# Patient Record
Sex: FEMALE | Employment: OTHER | URBAN - METROPOLITAN AREA
[De-identification: names, ages, dates, MRNs, and addresses within clinical notes are randomized per-mention and may not be internally consistent; named-entity substitution may affect disease eponyms.]

---

## 2017-05-12 ENCOUNTER — DOCUMENTATION ONLY (OUTPATIENT)
Dept: SLEEP MEDICINE | Age: 72
End: 2017-05-12

## 2017-05-12 NOTE — PROGRESS NOTES
Patient called need new strap for cpap mask, advised to call dme that order is still valid for cpap supplies.

## 2017-07-13 ENCOUNTER — OFFICE VISIT (OUTPATIENT)
Dept: SLEEP MEDICINE | Age: 72
End: 2017-07-13

## 2017-07-13 ENCOUNTER — DOCUMENTATION ONLY (OUTPATIENT)
Dept: SLEEP MEDICINE | Age: 72
End: 2017-07-13

## 2017-07-13 VITALS
DIASTOLIC BLOOD PRESSURE: 72 MMHG | HEIGHT: 64 IN | WEIGHT: 186 LBS | BODY MASS INDEX: 31.76 KG/M2 | SYSTOLIC BLOOD PRESSURE: 116 MMHG | HEART RATE: 71 BPM | OXYGEN SATURATION: 94 % | TEMPERATURE: 97.6 F

## 2017-07-13 DIAGNOSIS — G47.39 MIXED SLEEP APNEA: Primary | ICD-10-CM

## 2017-07-13 NOTE — MR AVS SNAPSHOT
Visit Information Date & Time Provider Department Dept. Phone Encounter #  
 7/13/2017 10:00 AM Adele Devlin, [address] [facility] [id number] Follow-up Instructions Return in about 1 year (around 7/13/2018), or if symptoms worsen or fail to improve. Follow-up and Disposition History Upcoming Health Maintenance Date Due DTaP/Tdap/Td series (1 - Tdap) 8/13/1967 BREAST CANCER SCRN MAMMOGRAM 8/13/1996 FOBT Q 1 YEAR AGE 50-75 8/13/1996 ZOSTER VACCINE AGE 60> 8/13/2006 GLAUCOMA SCREENING Q2Y 8/13/2011 Pneumococcal 65+ Low/Medium Risk (1 of 2 - PCV13) 8/13/2011 MEDICARE YEARLY EXAM 8/13/2011 INFLUENZA AGE 9 TO ADULT 8/1/2017 Allergies as of 7/13/2017  Review Complete On: 7/13/2017 By: Adele Devlin MD  
 No Known Allergies Current Immunizations  Never Reviewed No immunizations on file. Not reviewed this visit You Were Diagnosed With   
  
 Codes Comments Mixed sleep apnea    -  Primary ICD-10-CM: G47.39 
ICD-9-CM: 327.29 Vitals BP Pulse Temp Height(growth percentile) Weight(growth percentile) SpO2  
 116/72 71 97.6 °F (36.4 °C) 5' 4\" (1.626 m) 186 lb (84.4 kg) 94% BMI OB Status Smoking Status 31.93 kg/m2 Postmenopausal Never Smoker Vitals History BMI and BSA Data Body Mass Index Body Surface Area  
 31.93 kg/m 2 1.95 m 2 Your Updated Medication List  
  
   
This list is accurate as of: 7/13/17 10:22 AM.  Always use your most recent med list.  
  
  
  
  
 acetaminophen 325 mg tablet Commonly known as:  TYLENOL Take  by mouth every four (4) hours as needed for Pain. ARICEPT 10 mg tablet Generic drug:  donepezil Take 10 mg by mouth nightly. aspirin delayed-release 81 mg tablet Take  by mouth daily. baclofen 10 mg tablet Commonly known as:  LIORESAL Take 10 mg by mouth three (3) times daily. calcium-cholecalciferol (d3) 600-125 mg-unit Tab Take  by mouth. FISH OIL 1,000 mg Cap Generic drug:  omega-3 fatty acids-vitamin e Take 1 Cap by mouth. HYDROcodone-acetaminophen 5-325 mg per tablet Commonly known as:  [place] Take  by mouth.  
  
 lamoTRIgine 25 mg tablet Commonly known as: LaMICtal  
Take  by mouth daily. levothyroxine 75 mcg tablet Commonly known as:  SYNTHROID Take 75 mcg by mouth Daily (before breakfast). memantine 10 mg tablet Commonly known as:  Av Pry Take  by mouth daily. multivitamin with iron tablet Take 1 Tab by mouth daily. pravastatin 40 mg tablet Commonly known as:  PRAVACHOL Take 40 mg by mouth nightly. sertraline 50 mg tablet Commonly known as:  ZOLOFT Take  by mouth daily. VITAMIN D3 2,000 unit Tab Generic drug:  cholecalciferol (vitamin D3) Take  by mouth. We Performed the Following AMB SUPPLY ORDER [[id number] Custom] Comments:  
 Wireless modem enrollment with privileges to change therapy remotely - indefinite. PAP Mask -patient preference, tubing, filters as needed (all sleep supplies) Length of Need = 99 months Satinder Rooney M.D.  (electronically signed) Diplomate in Sleep Medicine, [facility] Follow-up Instructions Return in about 1 year (around 7/13/2018), or if symptoms worsen or fail to improve. Patient Instructions [address] [facility] [place]. 16[address] [facility], [address] Tel.  [phone] Fax. 100 [place] 60 [facility] 200 [facility] Tel.  [phone] Fax. [phone] [address] [place] [address] Tel.  [phone] Fax. [phone] Learning About CPAP for Sleep Apnea What is CPAP? CPAP is a small machine that you use at home every night while you sleep.  It increases air pressure in your throat to keep your airway open. When you have sleep apnea, this can help you sleep better so you feel much better. CPAP stands for \"continuous positive airway pressure. \" The CPAP machine will have one of the following: · A mask that covers your nose and mouth · Prongs that fit into your nose · A mask that covers your nose only, the most common type. This type is called NCPAP. The N stands for \"nasal.\" Why is it done? CPAP is usually the best treatment for obstructive sleep apnea. It is the first treatment choice and the most widely used. Your doctor may suggest CPAP if you have: · Moderate to severe sleep apnea. · Sleep apnea and coronary artery disease (CAD) or heart failure. How does it help? · CPAP can help you have more normal sleep, so you feel less sleepy and more alert during the daytime. · CPAP may help keep heart failure or other heart problems from getting worse. · NCPAP may help lower your blood pressure. · If you use CPAP, your bed partner may also sleep better because you are not snoring or restless. What are the side effects? Some people who use CPAP have: · A dry or stuffy nose and a sore throat. · Irritated skin on the face. · Sore eyes. · Bloating. If you have any of these problems, work with your doctor to fix them. Here are some things you can try: · Be sure the mask or nasal prongs fit well. · See if your doctor can adjust the pressure of your CPAP. · If your nose is dry, try a humidifier. · If your nose is runny or stuffy, try decongestant medicine or a steroid nasal spray. If these things do not help, you might try a different type of machine. Some machines have air pressure that adjusts on its own. Others have air pressures that are different when you breathe in than when you breathe out. This may reduce discomfort caused by too much pressure in your nose. Where can you learn more? Go to DealExplorer.be Enter G819 in the search box to learn more about \"Learning About CPAP for Sleep Apnea. \"  
© 2749-7489 Healthwise, Incorporated. Care instructions adapted under license by [employer] (which disclaims liability or warranty for this information). This care instruction is for use with your licensed healthcare professional. If you have questions about a medical condition or this instruction, always ask your healthcare professional. [address] any warranty or liability for your use of this information. Content Version: 5.3.15001; Last Revised: January 11, 2010 PROPER SLEEP HYGIENE What to avoid · Do not have drinks with caffeine, such as coffee or black tea, for 8 hours before bed. · Do not smoke or use other types of tobacco near bedtime. Nicotine is a stimulant and can keep you awake. · Avoid drinking alcohol late in the evening, because it can cause you to wake in the middle of the night. · Do not eat a big meal close to bedtime. If you are hungry, eat a light snack. · Do not drink a lot of water close to bedtime, because the need to urinate may wake you up during the night. · Do not read or watch TV in bed. Use the bed only for sleeping and sexual activity. What to try · Go to bed at the same time every night, and wake up at the same time every morning. Do not take naps during the day. · Keep your bedroom quiet, dark, and cool. · Get regular exercise, but not within 3 to 4 hours of your bedtime. Darell Ly · Sleep on a comfortable pillow and mattress. · If watching the clock makes you anxious, turn it facing away from you so you cannot see the time. · If you worry when you lie down, start a worry book. Well before bedtime, write down your worries, and then set the book and your concerns aside. · Try meditation or other relaxation techniques before you go to bed.  
· If you cannot fall asleep, get up and go to another room until you feel sleepy. Do something relaxing. Repeat your bedtime routine before you go to bed again. · Make your house quiet and calm about an hour before bedtime. Turn down the lights, turn off the TV, log off the computer, and turn down the volume on music. This can help you relax after a busy day. Drowsy Driving: The [employer] cites drowsiness as a causing factor in more than 169,689 police reported crashes annually, resulting in 76,000 injuries and 1,500 deaths. Other surveys suggest 55% of people polled have driven while drowsy in the past year, 23% had fallen asleep but not crashed, 3% crashed, and 2% had and accident due to drowsy driving. Who is at risk? Young Drivers: One study of drowsy driving accidents states that 55% of the drivers were under 25 years. Of those, 75% were male. Shift Workers and Travelers: People who work overnight or travel across time zones frequently are at higher risk of experiencing Circadian Rhythm Disorders. They are trying to work and function when their body is programed to sleep. Sleep Deprived: Lack of sleep has a serious impact on your ability to pay attention or focus on a task. Consistently getting less than the average of 8 hours your body needs creates partial or cumulative sleep deprivation. Untreated Sleep Disorders: Sleep Apnea, Narcolepsy, R.L.S., and other sleep disorders (untreated) prevent a person from getting enough restful sleep. This leads to excessive daytime sleepiness and increases the risk for drowsy driving accidents by up to 7 times. Medications / Alcohol: Even over the counter medications can cause drowsiness. Medications that impair a drivers attention should have a warning label. Alcohol naturally makes you sleepy and on its own can cause accidents. Combined with excessive drowsiness its effects are amplified. Signs of Drowsy Driving: * You don't remember driving the last few miles * You may drift out of your timur * You are unable to focus and your thoughts wander * You may yawn more often than normal 
 * You have difficulty keeping your eyes open / nodding off * Missing traffic signs, speeding, or tailgating Prevention-  
Good sleep hygiene, lifestyle and behavioral choices have the most impact on drowsy driving. There is no substitute for sleep and the average person requires 8 hours nightly. If you find yourself driving drowsy, stop and sleep. Consider the sleep hygiene tips provided during your visit as well. Medication Refill Policy: Refills for all medications require 1 week advance notice. Please have your pharmacy fax a refill request. We are unable to fax, or call in \"controled substance\" medications and you will need to pick these prescriptions up from our office. MovieLine Activation Thank you for requesting access to MovieLine. Please follow the instructions below to securely access and download your online medical record. MovieLine allows you to send messages to your doctor, view your test results, renew your prescriptions, schedule appointments, and more. How Do I Sign Up? 1. In your internet browser, go to [email or web address]. 2. Click on the First Time User? Click Here link in the Sign In box. You will see the New Member Sign Up page. 3. Enter your MovieLine Access Code exactly as it appears below. You will not need to use this code after youve completed the sign-up process. If you do not sign up before the expiration date, you must request a new code. MovieLine Access Code: [email or web address] Expires: 10/11/2017 10:19 AM (This is the date your MovieLine access code will ) 4. Enter the last four digits of your Social Security Number (xxxx) and Date of Birth (mm/dd/yyyy) as indicated and click Submit. You will be taken to the next sign-up page. 5. Create a MovieLine ID.  This will be your MovieLine login ID and cannot be changed, so think of one that is secure and easy to remember. 6. Create a Brightkit password. You can change your password at any time. 7. Enter your Password Reset Question and Answer. This can be used at a later time if you forget your password. 8. Enter your e-mail address. You will receive e-mail notification when new information is available in [address] 9. Click Sign Up. You can now view and download portions of your medical record. 10. Click the Download Summary menu link to download a portable copy of your medical information. Additional Information If you have questions, please call [phone]. Remember, Brightkit is NOT to be used for urgent needs. For medical emergencies, dial 911. Starting a Weight Loss Plan: After Your Visit Your Care Instructions If you are thinking about losing weight, it can be hard to know where to start. Your doctor can help you set up a weight loss plan that best meets your needs. You may want to take a class on nutrition or exercise, or join a weight loss support group. If you have questions about how to make changes to your eating or exercise habits, ask your doctor about seeing a registered dietitian or an exercise specialist. 
It can be a big challenge to lose weight. But you do not have to make huge changes at once. Make small changes, and stick with them. When those changes become habit, add a few more changes. If you do not think you are ready to make changes right now, try to pick a date in the future. Make an appointment to see your doctor to discuss whether the time is right for you to start a plan. Follow-up care is a key part of your treatment and safety. Be sure to make and go to all appointments, and call your doctor if you are having problems. It's also a good idea to know your test results and keep a list of the medicines you take. How can you care for yourself at home? · Set realistic goals.  Many people expect to lose much more weight than is likely. A weight loss of 5% to 10% of your body weight may be enough to improve your health. · Get family and friends involved to provide support. Talk to them about why you are trying to lose weight, and ask them to help. They can help by participating in exercise and having meals with you, even if they may be eating something different. · Find what works best for you. If you do not have time or do not like to cook, a program that offers meal replacement bars or shakes may be better for you. Or if you like to prepare meals, finding a plan that includes daily menus and recipes may be best. 
· Ask your doctor about other health professionals who can help you achieve your weight loss goals. ¨ A dietitian can help you make healthy changes in your diet. ¨ An exercise specialist or  can help you develop a safe and effective exercise program. 
¨ A counselor or psychiatrist can help you cope with issues such as depression, anxiety, or family problems that can make it hard to focus on weight loss. · Consider joining a support group for people who are trying to lose weight. Your doctor can suggest groups in your area. Where can you learn more? Go to Widow Games.be Enter X471 in the search box to learn more about \"Starting a Weight Loss Plan: After Your Visit. \"  
© 4752-4939 Healthwise, Incorporated. Care instructions adapted under license by Faustino Kenney (which disclaims liability or warranty for this information). This care instruction is for use with your licensed healthcare professional. If you have questions about a medical condition or this instruction, always ask your healthcare professional. [place] [address] any warranty or liability for your use of this information. Content Version: 8.7.09792; Last Revised: September 1, 2009 Introducing hospitals & HEALTH SERVICES! [facility] introduces [employer] patient portal. Now you can access parts of your medical record, email your doctor's office, and request medication refills online. 1. In your internet browser, go to [email or web address] 2. Click on the First Time User? Click Here link in the Sign In box. You will see the New Member Sign Up page. 3. Enter your Consumer Brands Access Code exactly as it appears below. You will not need to use this code after youve completed the sign-up process. If you do not sign up before the expiration date, you must request a new code. · [employer] Access Code: [email or web address] Expires: 10/11/2017 10:19 AM 
 
4. Enter the last four digits of your Social Security Number (xxxx) and Date of Birth (mm/dd/yyyy) as indicated and click Submit. You will be taken to the next sign-up page. 5. Create a Consumer Brands ID. This will be your Consumer Brands login ID and cannot be changed, so think of one that is secure and easy to remember. 6. Create a Consumer Brands password. You can change your password at any time. 7. Enter your Password Reset Question and Answer. This can be used at a later time if you forget your password. 8. Enter your e-mail address. You will receive e-mail notification when new information is available in [address] 9. Click Sign Up. You can now view and download portions of your medical record. 10. Click the Download Summary menu link to download a portable copy of your medical information. If you have questions, please visit the Frequently Asked Questions section of the Consumer Brands website. Remember, Consumer Brands is NOT to be used for urgent needs. For medical emergencies, dial 911. Now available from your iPhone and Android! Please provide this summary of care documentation to your next provider. If you have any questions after today's visit, please call [phone].

## 2017-07-13 NOTE — PROGRESS NOTES
[address] [place]. [place], [address]  Tel.  [phone]  Fax. 100 [facility] 60  [place], [address]  Tel.  [phone]  Fax. [phone] [address]  Tel.  [phone]  Fax. [phone]     S>Josettejoaquina Srivastava is a 80 y.o. female seen for a positive airway pressure follow-up. She reports no problems using the device. She is 70% compliant over the past 6 months. The following problems are identified:    Drowsiness no Problems exhaling no   Snoring no Forget to put on no   Mask Comfortable no Can't fall asleep no   Dry Mouth no Mask falls off no   Air Leaking yes Frequent awakenings no       She admits that his sleep has improved with ASV. No H/O CHF. 87% triggered breaths with average P/S 2.5. REN-6/hr. No Known Allergies    She has a current medication list which includes the following prescription(s): calcium-cholecalciferol (d3), memantine, hydrocodone-acetaminophen, aspirin delayed-release, multivitamin with iron, acetaminophen, baclofen, omega-3 fatty acids-vitamin e, lamotrigine, sertraline, cholecalciferol (vitamin d3), levothyroxine, donepezil, and pravastatin. .      She  has a past medical history of Dementia; Multiple sclerosis ([facility] [address] and Osteoporosis. [place] Sleepiness Score: 7   and Modified F.O.S.Q. Score Total / 2: 14   which reflect improved status quo sleep quality over therapy time. O>    Visit Vitals    /72    Pulse 71    Temp 97.6 °F (36.4 °C)    Ht 5' 4\" (1.626 m)    Wt 186 lb (84.4 kg)    SpO2 94%    BMI 31.93 kg/m2           General:   Alert, oriented, not in distress   Neck:   No JVD    Chest/Lungs:  symetrical lung expansion , no accessory muscle use    Extremities:  no obvious rashes , negative edema    Neuro:  No focal deficits ; No obvious tremor    Psych:  Normal affect ,  Normal countenance ;         A>    ICD-10-CM ICD-9-CM    1.  Mixed sleep apnea G47.39 327.29 AMB SUPPLY ORDER AHI = 15 (2013). On CPAP:  EPAP 8 cmH2O. Compliant:      yes    Therapeutic Response:  Positive    P>    Orders Placed This Encounter    AMB SUPPLY ORDER     Wireless modem enrollment with privileges to change therapy remotely - indefinite. PAP Mask -patient preference, tubing, filters as needed (all sleep supplies)  Length of Need = 99 months    Kamron Peralta M.D.  (electronically signed)  Jessie Mike in Sleep Medicine, [facility]           * Follow-up Disposition:  Return in about 1 year (around 7/13/2018), or if symptoms worsen or fail to improve. * She was asked to contact our office for any problems regarding PAP therapy. * Counseling was provided regarding the importance of regular PAP use and on proper sleep hygiene and safe driving. * Re-enforced proper and regular cleaning for the device. Discussed the patient's above normal BMI with her. I have recommended the following interventions: dietary management education, guidance, and counseling . The BMI follow up plan is as follows: BMI is out of normal parameters and plan is as follows: I have counseled this patient on diet and exercise regimens    Thank you for allowing us to participate in your patient's medical care. Kamron Peralta M.D.   Diplomate in Sleep Medicine, [facility]

## 2017-07-13 NOTE — PATIENT INSTRUCTIONS
217 [facility] [place]. [place], [address]  Tel.  [phone]  Fax. 100 [facility] 60  [place], [address] [facility]  Tel.  [phone]  Fax. [phone] [address]  Tel.  [phone]  Fax. [phone]     Learning About CPAP for Sleep Apnea  What is CPAP? CPAP is a small machine that you use at home every night while you sleep. It increases air pressure in your throat to keep your airway open. When you have sleep apnea, this can help you sleep better so you feel much better. CPAP stands for \"continuous positive airway pressure. \"  The CPAP machine will have one of the following:  · A mask that covers your nose and mouth  · Prongs that fit into your nose  · A mask that covers your nose only, the most common type. This type is called NCPAP. The N stands for \"nasal.\"  Why is it done? CPAP is usually the best treatment for obstructive sleep apnea. It is the first treatment choice and the most widely used. Your doctor may suggest CPAP if you have:  · Moderate to severe sleep apnea. · Sleep apnea and coronary artery disease (CAD) or heart failure. How does it help? · CPAP can help you have more normal sleep, so you feel less sleepy and more alert during the daytime. · CPAP may help keep heart failure or other heart problems from getting worse. · NCPAP may help lower your blood pressure. · If you use CPAP, your bed partner may also sleep better because you are not snoring or restless. What are the side effects? Some people who use CPAP have:  · A dry or stuffy nose and a sore throat. · Irritated skin on the face. · Sore eyes. · Bloating. If you have any of these problems, work with your doctor to fix them. Here are some things you can try:  · Be sure the mask or nasal prongs fit well. · See if your doctor can adjust the pressure of your CPAP. · If your nose is dry, try a humidifier.   · If your nose is runny or stuffy, try decongestant medicine or a steroid nasal spray. If these things do not help, you might try a different type of machine. Some machines have air pressure that adjusts on its own. Others have air pressures that are different when you breathe in than when you breathe out. This may reduce discomfort caused by too much pressure in your nose. Where can you learn more? Go to [employer]mersion Holdings.be  Enter Yana Lyle in the search box to learn more about \"Learning About CPAP for Sleep Apnea. \"   © 5967-0125 Healthwise, Incorporated. Care instructions adapted under license by [employer] (which disclaims liability or warranty for this information). This care instruction is for use with your licensed healthcare professional. If you have questions about a medical condition or this instruction, always ask your healthcare professional. [address] any warranty or liability for your use of this information. Content Version: 3.5.44627; Last Revised: January 11, 2010  PROPER SLEEP HYGIENE    What to avoid  · Do not have drinks with caffeine, such as coffee or black tea, for 8 hours before bed. · Do not smoke or use other types of tobacco near bedtime. Nicotine is a stimulant and can keep you awake. · Avoid drinking alcohol late in the evening, because it can cause you to wake in the middle of the night. · Do not eat a big meal close to bedtime. If you are hungry, eat a light snack. · Do not drink a lot of water close to bedtime, because the need to urinate may wake you up during the night. · Do not read or watch TV in bed. Use the bed only for sleeping and sexual activity. What to try  · Go to bed at the same time every night, and wake up at the same time every morning. Do not take naps during the day. · Keep your bedroom quiet, dark, and cool. · Get regular exercise, but not within 3 to 4 hours of your bedtime. .  · Sleep on a comfortable pillow and mattress.   · If watching the clock makes you anxious, turn it facing away from you so you cannot see the time. · If you worry when you lie down, start a worry book. Well before bedtime, write down your worries, and then set the book and your concerns aside. · Try meditation or other relaxation techniques before you go to bed. · If you cannot fall asleep, get up and go to another room until you feel sleepy. Do something relaxing. Repeat your bedtime routine before you go to bed again. · Make your house quiet and calm about an hour before bedtime. Turn down the lights, turn off the TV, log off the computer, and turn down the volume on music. This can help you relax after a busy day. Drowsy Driving: [employer] cites drowsiness as a causing factor in more than 516,128 police reported crashes annually, resulting in 76,000 injuries and 1,500 deaths. Other surveys suggest 55% of people polled have driven while drowsy in the past year, 23% had fallen asleep but not crashed, 3% crashed, and 2% had and accident due to drowsy driving. Who is at risk? Young Drivers: One study of drowsy driving accidents states that 55% of the drivers were under 25 years. Of those, 75% were male. Shift Workers and Travelers: People who work overnight or travel across time zones frequently are at higher risk of experiencing Circadian Rhythm Disorders. They are trying to work and function when their body is programed to sleep. Sleep Deprived: Lack of sleep has a serious impact on your ability to pay attention or focus on a task. Consistently getting less than the average of 8 hours your body needs creates partial or cumulative sleep deprivation. Untreated Sleep Disorders: Sleep Apnea, Narcolepsy, R.L.S., and other sleep disorders (untreated) prevent a person from getting enough restful sleep. This leads to excessive daytime sleepiness and increases the risk for drowsy driving accidents by up to 7 times.   Medications / Alcohol: Even over the counter medications can cause drowsiness. Medications that impair a drivers attention should have a warning label. Alcohol naturally makes you sleepy and on its own can cause accidents. Combined with excessive drowsiness its effects are amplified. Signs of Drowsy Driving:   * You don't remember driving the last few miles   * You may drift out of your timur   * You are unable to focus and your thoughts wander   * You may yawn more often than normal   * You have difficulty keeping your eyes open / nodding off   * Missing traffic signs, speeding, or tailgating  Prevention-   Good sleep hygiene, lifestyle and behavioral choices have the most impact on drowsy driving. There is no substitute for sleep and the average person requires 8 hours nightly. If you find yourself driving drowsy, stop and sleep. Consider the sleep hygiene tips provided during your visit as well. Medication Refill Policy: Refills for all medications require 1 week advance notice. Please have your pharmacy fax a refill request. We are unable to fax, or call in \"controled substance\" medications and you will need to pick these prescriptions up from our office. OurStay Activation    Thank you for requesting access to OurStay. Please follow the instructions below to securely access and download your online medical record. OurStay allows you to send messages to your doctor, view your test results, renew your prescriptions, schedule appointments, and more. How Do I Sign Up? 1. In your internet browser, go to [email or web address]. 2. Click on the First Time User? Click Here link in the Sign In box. You will see the New Member Sign Up page. 3. Enter your OurStay Access Code exactly as it appears below. You will not need to use this code after youve completed the sign-up process. If you do not sign up before the expiration date, you must request a new code.     OurStay Access Code: [email or web address]  Expires: 10/11/2017 10:19 AM (This is the date your SecureMedia access code will )    4. Enter the last four digits of your Social Security Number (xxxx) and Date of Birth (mm/dd/yyyy) as indicated and click Submit. You will be taken to the next sign-up page. 5. Create a SecureMedia ID. This will be your SecureMedia login ID and cannot be changed, so think of one that is secure and easy to remember. 6. Create a SecureMedia password. You can change your password at any time. 7. Enter your Password Reset Question and Answer. This can be used at a later time if you forget your password. 8. Enter your e-mail address. You will receive e-mail notification when new information is available in [address] 9. Click Sign Up. You can now view and download portions of your medical record. 10. Click the Download Summary menu link to download a portable copy of your medical information. Additional Information    If you have questions, please call [phone]. Remember, SecureMedia is NOT to be used for urgent needs. For medical emergencies, dial 911. Starting a Weight Loss Plan: After Your Visit  Your Care Instructions  If you are thinking about losing weight, it can be hard to know where to start. Your doctor can help you set up a weight loss plan that best meets your needs. You may want to take a class on nutrition or exercise, or join a weight loss support group. If you have questions about how to make changes to your eating or exercise habits, ask your doctor about seeing a registered dietitian or an exercise specialist.  It can be a big challenge to lose weight. But you do not have to make huge changes at once. Make small changes, and stick with them. When those changes become habit, add a few more changes. If you do not think you are ready to make changes right now, try to pick a date in the future.  Make an appointment to see your doctor to discuss whether the time is right for you to start a plan.  Follow-up care is a key part of your treatment and safety. Be sure to make and go to all appointments, and call your doctor if you are having problems. It's also a good idea to know your test results and keep a list of the medicines you take. How can you care for yourself at home? · Set realistic goals. Many people expect to lose much more weight than is likely. A weight loss of 5% to 10% of your body weight may be enough to improve your health. · Get family and friends involved to provide support. Talk to them about why you are trying to lose weight, and ask them to help. They can help by participating in exercise and having meals with you, even if they may be eating something different. · Find what works best for you. If you do not have time or do not like to cook, a program that offers meal replacement bars or shakes may be better for you. Or if you like to prepare meals, finding a plan that includes daily menus and recipes may be best.  · Ask your doctor about other health professionals who can help you achieve your weight loss goals. ¨ A dietitian can help you make healthy changes in your diet. ¨ An exercise specialist or  can help you develop a safe and effective exercise program.  ¨ A counselor or psychiatrist can help you cope with issues such as depression, anxiety, or family problems that can make it hard to focus on weight loss. · Consider joining a support group for people who are trying to lose weight. Your doctor can suggest groups in your area. Where can you learn more? Go to BoomTown.be  Enter U357 in the search box to learn more about \"Starting a Weight Loss Plan: After Your Visit. \"   © 3683-0135 Healthwise, Incorporated. Care instructions adapted under license by [employer] (which disclaims liability or warranty for this information).  This care instruction is for use with your licensed healthcare professional. If you have questions about a medical condition or this instruction, always ask your healthcare professional. [place] [address] any warranty or liability for your use of this information.   Content Version: 8.3.74104; Last Revised: September 1, 2009

## 2018-06-25 ENCOUNTER — TELEPHONE (OUTPATIENT)
Dept: SLEEP MEDICINE | Age: 73
End: 2018-06-25

## 2018-06-25 ENCOUNTER — DOCUMENTATION ONLY (OUTPATIENT)
Dept: SLEEP MEDICINE | Age: 73
End: 2018-06-25

## 2018-06-25 DIAGNOSIS — G47.33 OSA (OBSTRUCTIVE SLEEP APNEA): Primary | ICD-10-CM

## 2018-06-25 NOTE — TELEPHONE ENCOUNTER
Orders Placed This Encounter    AMB SUPPLY ORDER     Diagnosis: (G47.33) HANK (obstructive sleep apnea)  (primary encounter diagnosis)     Replacement Supplies for Positive Airway Pressure Therapy Device:   Duration of need: 99 months.  Oral/Nasal Combo Mask 1 every 3 months.  Oral Cushion Combo Mask (Replace) 2 per month.  Nasal Pillows Combo Mask (Replace) 2 per month.  Full Face Mask 1 every 3 months.  Full Face Mask Cushion 1 per month.  Nasal Cushion (Replace) 2 per month.  Nasal Pillows (Replace) 2 per month.  Nasal Interface Mask 1 every 3 months.  Headgear 1 every 6 months.  Chinstrap 1 every 6 months.  Tubing 1 every 3 months.  Filter(s) Disposable 2 per month.  Filter(s) Non-Disposable 1 every 6 months.  Oral Interface 1 every 3 months. [address] for [employer] (Replace) 1 every 6 months.  Tubing with heating element 1 every 3 months. Perform Mask Fitting per patient preference and comfort - replace as above. Craig Clement MD, FAASM; NPI: [id number]    Electronically signed.  Date:- 06/25/18

## 2018-06-25 NOTE — TELEPHONE ENCOUNTER
Patient's daughter called stating is in long term care facility and needs cpap supplies, patient is a former patient of Dr. Wilbert Burns and was last seen in 2017. Yearly follow up has been scheduled for 7/9/18 with Dr. Dillon Coronado. Please send supply order as the current order on file is no longer valid due to written by Dr. Neelam Grewal.

## 2018-06-25 NOTE — PROGRESS NOTES
Faxed supply order to [facility] per previous dme no longer takes patient's insurances.  Patient's daughter has been given contact information by [facility] of dme

## 2018-07-09 ENCOUNTER — OFFICE VISIT (OUTPATIENT)
Dept: SLEEP MEDICINE | Age: 73
End: 2018-07-09

## 2018-07-09 ENCOUNTER — DOCUMENTATION ONLY (OUTPATIENT)
Dept: SLEEP MEDICINE | Age: 73
End: 2018-07-09

## 2018-07-09 VITALS
HEIGHT: 64 IN | DIASTOLIC BLOOD PRESSURE: 66 MMHG | WEIGHT: 185 LBS | SYSTOLIC BLOOD PRESSURE: 121 MMHG | HEART RATE: 60 BPM | BODY MASS INDEX: 31.58 KG/M2 | OXYGEN SATURATION: 95 %

## 2018-07-09 DIAGNOSIS — G47.39 MIXED SLEEP APNEA: Primary | ICD-10-CM

## 2018-07-09 DIAGNOSIS — I42.9 CARDIOMYOPATHY, UNSPECIFIED TYPE (HCC): ICD-10-CM

## 2018-07-09 DIAGNOSIS — G35 MS (MULTIPLE SCLEROSIS) (HCC): ICD-10-CM

## 2018-07-09 DIAGNOSIS — Z86.59 H/O: DEPRESSION: ICD-10-CM

## 2018-07-09 RX ORDER — ACETAMINOPHEN 325 MG/1
TABLET ORAL
COMMUNITY
End: 2020-02-06

## 2018-07-09 RX ORDER — CARBIDOPA AND LEVODOPA 10; 100 MG/1; MG/1
1 TABLET ORAL 3 TIMES DAILY
COMMUNITY
End: 2020-01-09 | Stop reason: DRUGHIGH

## 2018-07-09 RX ORDER — PROPRANOLOL HYDROCHLORIDE 10 MG/1
TABLET ORAL 3 TIMES DAILY
COMMUNITY
End: 2021-04-26 | Stop reason: SDUPTHER

## 2018-07-09 RX ORDER — PROMETHAZINE HYDROCHLORIDE 12.5 MG/1
TABLET ORAL
COMMUNITY
End: 2021-07-27

## 2018-07-09 NOTE — PATIENT INSTRUCTIONS
217 [facility] [place]. [address] [facility], [address] Tel.  [phone] Fax. 100 [facility] 60 [place], [address] [facility] [address] Tel.  [phone] Fax. [phone] [address] Tel.  [phone] Fax. [phone] Learning About CPAP for Sleep Apnea What is CPAP? CPAP is a small machine that you use at home every night while you sleep. It increases air pressure in your throat to keep your airway open. When you have sleep apnea, this can help you sleep better so you feel much better. CPAP stands for \"continuous positive airway pressure. \" The CPAP machine will have one of the following: · A mask that covers your nose and mouth · Prongs that fit into your nose · A mask that covers your nose only, the most common type. This type is called NCPAP. The N stands for \"nasal.\" Why is it done? CPAP is usually the best treatment for obstructive sleep apnea. It is the first treatment choice and the most widely used. Your doctor may suggest CPAP if you have: · Moderate to severe sleep apnea. · Sleep apnea and coronary artery disease (CAD) or heart failure. How does it help? · CPAP can help you have more normal sleep, so you feel less sleepy and more alert during the daytime. · CPAP may help keep heart failure or other heart problems from getting worse. · NCPAP may help lower your blood pressure. · If you use CPAP, your bed partner may also sleep better because you are not snoring or restless. What are the side effects? Some people who use CPAP have: · A dry or stuffy nose and a sore throat. · Irritated skin on the face. · Sore eyes. · Bloating. If you have any of these problems, work with your doctor to fix them. Here are some things you can try: · Be sure the mask or nasal prongs fit well. · See if your doctor can adjust the pressure of your CPAP. · If your nose is dry, try a humidifier.  
· If your nose is runny or stuffy, try decongestant medicine or a steroid nasal spray. If these things do not help, you might try a different type of machine. Some machines have air pressure that adjusts on its own. Others have air pressures that are different when you breathe in than when you breathe out. This may reduce discomfort caused by too much pressure in your nose. Where can you learn more? Go to Prestiamoci.be Enter M217 in the search box to learn more about \"Learning About CPAP for Sleep Apnea. \"  
© 4338-0028 Healthwise, Incorporated. Care instructions adapted under license by [facility] [email or web address] (which disclaims liability or warranty for this information). This care instruction is for use with your licensed healthcare professional. If you have questions about a medical condition or this instruction, always ask your healthcare professional. [address] any warranty or liability for your use of this information. Content Version: 0.5.22215; Last Revised: January 11, 2010 PROPER SLEEP HYGIENE What to avoid · Do not have drinks with caffeine, such as coffee or black tea, for 8 hours before bed. · Do not smoke or use other types of tobacco near bedtime. Nicotine is a stimulant and can keep you awake. · Avoid drinking alcohol late in the evening, because it can cause you to wake in the middle of the night. · Do not eat a big meal close to bedtime. If you are hungry, eat a light snack. · Do not drink a lot of water close to bedtime, because the need to urinate may wake you up during the night. · Do not read or watch TV in bed. Use the bed only for sleeping and sexual activity. What to try · Go to bed at the same time every night, and wake up at the same time every morning. Do not take naps during the day. · Keep your bedroom quiet, dark, and cool. · Get regular exercise, but not within 3 to 4 hours of your bedtime. [place] · Sleep on a comfortable pillow and mattress.  
· If watching the clock makes you anxious, turn it facing away from you so you cannot see the time. · If you worry when you lie down, start a worry book. Well before bedtime, write down your worries, and then set the book and your concerns aside. · Try meditation or other relaxation techniques before you go to bed. · If you cannot fall asleep, get up and go to another room until you feel sleepy. Do something relaxing. Repeat your bedtime routine before you go to bed again. · Make your house quiet and calm about an hour before bedtime. Turn down the lights, turn off the TV, log off the computer, and turn down the volume on music. This can help you relax after a busy day. Drowsy Driving: [employer] cites drowsiness as a causing factor in more than 682,352 police reported crashes annually, resulting in 76,000 injuries and 1,500 deaths. Other surveys suggest 55% of people polled have driven while drowsy in the past year, 23% had fallen asleep but not crashed, 3% crashed, and 2% had and accident due to drowsy driving. Who is at risk? Young Drivers: One study of drowsy driving accidents states that 55% of the drivers were under 25 years. Of those, 75% were male. Shift Workers and Travelers: People who work overnight or travel across time zones frequently are at higher risk of experiencing Circadian Rhythm Disorders. They are trying to work and function when their body is programed to sleep. Sleep Deprived: Lack of sleep has a serious impact on your ability to pay attention or focus on a task. Consistently getting less than the average of 8 hours your body needs creates partial or cumulative sleep deprivation. Untreated Sleep Disorders: Sleep Apnea, Narcolepsy, R.L.S., and other sleep disorders (untreated) prevent a person from getting enough restful sleep. This leads to excessive daytime sleepiness and increases the risk for drowsy driving accidents by up to 7 times.  
Medications / Alcohol: Even over the counter medications can cause drowsiness. Medications that impair a drivers attention should have a warning label. Alcohol naturally makes you sleepy and on its own can cause accidents. Combined with excessive drowsiness its effects are amplified. Signs of Drowsy Driving: * You don't remember driving the last few miles * You may drift out of your timur * You are unable to focus and your thoughts wander * You may yawn more often than normal 
 * You have difficulty keeping your eyes open / nodding off * Missing traffic signs, speeding, or tailgating Prevention-  
Good sleep hygiene, lifestyle and behavioral choices have the most impact on drowsy driving. There is no substitute for sleep and the average person requires 8 hours nightly. If you find yourself driving drowsy, stop and sleep. Consider the sleep hygiene tips provided during your visit as well. Medication Refill Policy: Refills for all medications require 1 week advance notice. Please have your pharmacy fax a refill request. We are unable to fax, or call in \"controled substance\" medications and you will need to pick these prescriptions up from our office. [email or web address] Activation Thank you for requesting access to [email or web address]. Please follow the instructions below to securely access and download your online medical record. [email or web address] allows you to send messages to your doctor, view your test results, renew your prescriptions, schedule appointments, and more. How Do I Sign Up? 1. In your internet browser, go to [email or web address]. 2. Click on the First Time User? Click Here link in the Sign In box. You will see the New Member Sign Up page. 3. Enter your [email or web address] Access Code exactly as it appears below. You will not need to use this code after youve completed the sign-up process. If you do not sign up before the expiration date, you must request a new code.  
 
[email or web address] Access Code: [email or web address] Expires: 10/7/2018 11:30 AM (This is the date your Larotec access code will ) 4. Enter the last four digits of your Social Security Number (xxxx) and Date of Birth (mm/dd/yyyy) as indicated and click Submit. You will be taken to the next sign-up page. 5. Create a Larotec ID. This will be your Larotec login ID and cannot be changed, so think of one that is secure and easy to remember. 6. Create a Larotec password. You can change your password at any time. 7. Enter your Password Reset Question and Answer. This can be used at a later time if you forget your password. 8. Enter your e-mail address. You will receive e-mail notification when new information is available in [address] 9. Click Sign Up. You can now view and download portions of your medical record. 10. Click the Download Summary menu link to download a portable copy of your medical information. Additional Information If you have questions, please call [phone]. Remember, Larotec is NOT to be used for urgent needs. For medical emergencies, dial 911.

## 2018-07-09 NOTE — MR AVS SNAPSHOT
[address] [place] [address] 
 
 
 [address] [facility] [address] [id number] [phone] Patient: Ana Maria Hay MRN: [id number] Visit Information Date & Time Provider Department Dept. Phone Encounter #  
 7/9/2018 10:40 AM Kati Wade MD [facility] 53 [id number] Follow-up Instructions Return in about 1 year (around 7/9/2019), or if symptoms worsen or fail to improve. Routing History Follow-up and Disposition History Upcoming Health Maintenance Date Due DTaP/Tdap/Td series (1 - Tdap) 8/13/1967 BREAST CANCER SCRN MAMMOGRAM 8/13/1996 FOBT Q 1 YEAR AGE 50-75 8/13/1996 ZOSTER VACCINE AGE 60> 6/13/2006 GLAUCOMA SCREENING Q2Y 8/13/2011 Pneumococcal 65+ Low/Medium Risk (1 of 2 - PCV13) 8/13/2011 MEDICARE YEARLY EXAM 3/14/2018 Influenza Age 5 to Adult 8/1/2018 Allergies as of 7/9/2018  Review Complete On: 7/9/2018 By: Kati Wade MD  
 No Known Allergies Current Immunizations  Never Reviewed No immunizations on file. Not reviewed this visit You Were Diagnosed With   
  
 Codes Comments Mixed sleep apnea    -  Primary ICD-10-CM: G47.39 
ICD-9-CM: 327.29 Cardiomyopathy, unspecified type ([facility].)     ICD-10-CM: I42.9 ICD-9-CM: 425.4 MS (multiple sclerosis) ([facility].)     ICD-10-CM: G35 
ICD-9-CM: 923 BMI 31.0-31.9,adult     ICD-10-CM: Z68.31 
ICD-9-CM: V85.31   
 H/O: depression     ICD-10-CM: Z86.59 
ICD-9-CM: V11.8 Vitals BP Pulse Height(growth percentile) Weight(growth percentile) SpO2 BMI  
 121/66 60 5' 4\" (1.626 m) 185 lb (83.9 kg) 95% 31.76 kg/m2 OB Status Smoking Status Postmenopausal Never Smoker Vitals History BMI and BSA Data Body Mass Index Body Surface Area 31.76 kg/m 2 1.95 m 2 Your Updated Medication List  
  
   
 This list is accurate as of 7/9/18 12:12 PM.  Always use your most recent med list.  
  
  
  
  
 * acetaminophen 325 mg tablet Commonly known as:  TYLENOL Take  by mouth every four (4) hours as needed for Pain. * Mapap 325 mg tablet Generic drug:  acetaminophen Take  by mouth every four (4) hours as needed for Pain. ARICEPT 10 mg tablet Generic drug:  donepezil Take 10 mg by mouth nightly. aspirin delayed-release 81 mg tablet Take  by mouth daily. baclofen 10 mg tablet Commonly known as:  LIORESAL Take 10 mg by mouth three (3) times daily. calcium-cholecalciferol (d3) 600-125 mg-unit Tab Take  by mouth. FISH OIL 1,000 mg Cap Generic drug:  omega-3 fatty acids-vitamin e Take 1 Cap by mouth. HYDROcodone-acetaminophen 5-325 mg per tablet Commonly known as:  Zelalem Bent Take  by mouth.  
  
 lamoTRIgine 25 mg tablet Commonly known as: LaMICtal  
Take  by mouth daily. levothyroxine 75 mcg tablet Commonly known as:  SYNTHROID Take 75 mcg by mouth Daily (before breakfast). memantine 10 mg tablet Commonly known as:  Jamil Canavan Take  by mouth daily. multivitamin with iron tablet Take 1 Tab by mouth daily. multivitamin, tx-iron-ca-min 9 mg iron-400 mcg Tab tablet Commonly known as:  THERA-M w/ IRON Take 1 Tab by mouth daily. pravastatin 40 mg tablet Commonly known as:  PRAVACHOL Take 40 mg by mouth nightly. promethazine 12.5 mg tablet Commonly known as:  PHENERGAN Take  by mouth every six (6) hours as needed for Nausea. propranolol 10 mg tablet Commonly known as:  INDERAL Take  by mouth three (3) times daily. sertraline 50 mg tablet Commonly known as:  ZOLOFT Take  by mouth daily. SINEMET  mg per tablet Generic drug:  carbidopa-levodopa Take 1 Tab by mouth three (3) times daily. VITAMIN D3 2,000 unit Tab Generic drug:  cholecalciferol (vitamin D3) Take  by mouth. * Notice: This list has 2 medication(s) that are the same as other medications prescribed for you. Read the directions carefully, and ask your doctor or other care provider to review them with you. Follow-up Instructions Return in about 1 year (around 7/9/2019), or if symptoms worsen or fail to improve. To-Do List   
 07/09/2018 ECHO:  2D ECHO COMPLETE ADULT (TTE) W OR WO CONTR   
  
 07/09/2018 Sleep Center:  SLEEP LAB (PAP TITRATION) Patient Instructions 201 [facility] [place]. 1668 [facility], [address] Tel.  [phone] Fax. 100 [facility] 60 [place], 200 [facility] Tel.  [phone] Fax. [phone] [address] [place] [address] Tel.  [phone] Fax. [phone] Learning About CPAP for Sleep Apnea What is CPAP? CPAP is a small machine that you use at home every night while you sleep. It increases air pressure in your throat to keep your airway open. When you have sleep apnea, this can help you sleep better so you feel much better. CPAP stands for \"continuous positive airway pressure. \" The CPAP machine will have one of the following: · A mask that covers your nose and mouth · Prongs that fit into your nose · A mask that covers your nose only, the most common type. This type is called NCPAP. The N stands for \"nasal.\" Why is it done? CPAP is usually the best treatment for obstructive sleep apnea. It is the first treatment choice and the most widely used. Your doctor may suggest CPAP if you have: · Moderate to severe sleep apnea. · Sleep apnea and coronary artery disease (CAD) or heart failure. How does it help? · CPAP can help you have more normal sleep, so you feel less sleepy and more alert during the daytime. · CPAP may help keep heart failure or other heart problems from getting worse. · NCPAP may help lower your blood pressure. · If you use CPAP, your bed partner may also sleep better because you are not snoring or restless. What are the side effects? Some people who use CPAP have: · A dry or stuffy nose and a sore throat. · Irritated skin on the face. · Sore eyes. · Bloating. If you have any of these problems, work with your doctor to fix them. Here are some things you can try: · Be sure the mask or nasal prongs fit well. · See if your doctor can adjust the pressure of your CPAP. · If your nose is dry, try a humidifier. · If your nose is runny or stuffy, try decongestant medicine or a steroid nasal spray. If these things do not help, you might try a different type of machine. Some machines have air pressure that adjusts on its own. Others have air pressures that are different when you breathe in than when you breathe out. This may reduce discomfort caused by too much pressure in your nose. Where can you learn more? Go to RocketPlay.be Enter L115 in the search box to learn more about \"Learning About CPAP for Sleep Apnea. \"  
© 9706-2767 Healthwise, Incorporated. Care instructions adapted under license by [employer] (which disclaims liability or warranty for this information). This care instruction is for use with your licensed healthcare professional. If you have questions about a medical condition or this instruction, always ask your healthcare professional. [place] [address] any warranty or liability for your use of this information. Content Version: 0.7.74229; Last Revised: January 11, 2010 PROPER SLEEP HYGIENE What to avoid · Do not have drinks with caffeine, such as coffee or black tea, for 8 hours before bed. · Do not smoke or use other types of tobacco near bedtime. Nicotine is a stimulant and can keep you awake. · Avoid drinking alcohol late in the evening, because it can cause you to wake in the middle of the night. · Do not eat a big meal close to bedtime. If you are hungry, eat a light snack. · Do not drink a lot of water close to bedtime, because the need to urinate may wake you up during the night. · Do not read or watch TV in bed. Use the bed only for sleeping and sexual activity. What to try · Go to bed at the same time every night, and wake up at the same time every morning. Do not take naps during the day. · Keep your bedroom quiet, dark, and cool. · Get regular exercise, but not within 3 to 4 hours of your bedtime. Jf Castañeda · Sleep on a comfortable pillow and mattress. · If watching the clock makes you anxious, turn it facing away from you so you cannot see the time. · If you worry when you lie down, start a worry book. Well before bedtime, write down your worries, and then set the book and your concerns aside. · Try meditation or other relaxation techniques before you go to bed. · If you cannot fall asleep, get up and go to another room until you feel sleepy. Do something relaxing. Repeat your bedtime routine before you go to bed again. · Make your house quiet and calm about an hour before bedtime. Turn down the lights, turn off the TV, log off the computer, and turn down the volume on music. This can help you relax after a busy day. Drowsy Driving: The [facility] cites drowsiness as a causing factor in more than 739,243 police reported crashes annually, resulting in 76,000 injuries and 1,500 deaths. Other surveys suggest 55% of people polled have driven while drowsy in the past year, 23% had fallen asleep but not crashed, 3% crashed, and 2% had and accident due to drowsy driving. Who is at risk? Young Drivers: One study of drowsy driving accidents states that 55% of the drivers were under 25 years. Of those, 75% were male.   
Shift Workers and Travelers: People who work overnight or travel across time zones frequently are at higher risk of experiencing Circadian Rhythm Disorders. They are trying to work and function when their body is programed to sleep. Sleep Deprived: Lack of sleep has a serious impact on your ability to pay attention or focus on a task. Consistently getting less than the average of 8 hours your body needs creates partial or cumulative sleep deprivation. Untreated Sleep Disorders: Sleep Apnea, Narcolepsy, R.L.S., and other sleep disorders (untreated) prevent a person from getting enough restful sleep. This leads to excessive daytime sleepiness and increases the risk for drowsy driving accidents by up to 7 times. Medications / Alcohol: Even over the counter medications can cause drowsiness. Medications that impair a drivers attention should have a warning label. Alcohol naturally makes you sleepy and on its own can cause accidents. Combined with excessive drowsiness its effects are amplified. Signs of Drowsy Driving: * You don't remember driving the last few miles * You may drift out of your timur * You are unable to focus and your thoughts wander * You may yawn more often than normal 
 * You have difficulty keeping your eyes open / nodding off * Missing traffic signs, speeding, or tailgating Prevention-  
Good sleep hygiene, lifestyle and behavioral choices have the most impact on drowsy driving. There is no substitute for sleep and the average person requires 8 hours nightly. If you find yourself driving drowsy, stop and sleep. Consider the sleep hygiene tips provided during your visit as well. Medication Refill Policy: Refills for all medications require 1 week advance notice. Please have your pharmacy fax a refill request. We are unable to fax, or call in \"controled substance\" medications and you will need to pick these prescriptions up from our office. Exegy Activation Thank you for requesting access to Exegy.  Please follow the instructions below to securely access and download your online medical record. [employer] allows you to send messages to your doctor, view your test results, renew your prescriptions, schedule appointments, and more. How Do I Sign Up? 1. In your internet browser, go to [email or web address]. 2. Click on the First Time User? Click Here link in the Sign In box. You will see the New Member Sign Up page. 3. Enter your Carbon Digital Access Code exactly as it appears below. You will not need to use this code after youve completed the sign-up process. If you do not sign up before the expiration date, you must request a new code. [employer] Access Code: [email or web address] Expires: 10/7/2018 11:30 AM (This is the date your [employer] Digital access code will ) 4. Enter the last four digits of your Social Security Number (xxxx) and Date of Birth (mm/dd/yyyy) as indicated and click Submit. You will be taken to the next sign-up page. 5. Create a Carbon Digital ID. This will be your Carbon Digital login ID and cannot be changed, so think of one that is secure and easy to remember. 6. Create a [employer] Digital password. You can change your password at any time. 7. Enter your Password Reset Question and Answer. This can be used at a later time if you forget your password. 8. Enter your e-mail address. You will receive e-mail notification when new information is available in [address] 9. Click Sign Up. You can now view and download portions of your medical record. 10. Click the Download Summary menu link to download a portable copy of your medical information. Additional Information If you have questions, please call [phone]. Remember, [employer] is NOT to be used for urgent needs. For medical emergencies, dial 911. Introducing [facility]!    
 Lara Davis introduces [employer] Digital patient portal. Now you can access parts of your medical record, email your doctor's office, and request medication refills online. 1. In your internet browser, go to [email or web address] 2. Click on the First Time User? Click Here link in the Sign In box. You will see the New Member Sign Up page. 3. Enter your Rewalon Access Code exactly as it appears below. You will not need to use this code after youve completed the sign-up process. If you do not sign up before the expiration date, you must request a new code. · Rew[employer]on Access Code: [email or web address] Expires: 10/7/2018 11:30 AM 
 
4. Enter the last four digits of your Social Security Number (xxxx) and Date of Birth (mm/dd/yyyy) as indicated and click Submit. You will be taken to the next sign-up page. 5. Create a ClarityRayt ID. This will be your Rewalon login ID and cannot be changed, so think of one that is secure and easy to remember. 6. Create a Rewalon password. You can change your password at any time. 7. Enter your Password Reset Question and Answer. This can be used at a later time if you forget your password. 8. Enter your e-mail address. You will receive e-mail notification when new information is available in [address] 9. Click Sign Up. You can now view and download portions of your medical record. 10. Click the Download Summary menu link to download a portable copy of your medical information. If you have questions, please visit the Frequently Asked Questions section of the Rewalon website. Remember, Rewalon is NOT to be used for urgent needs. For medical emergencies, dial 911. Now available from your iPhone and Android! Please provide this summary of care documentation to your next provider. If you have any questions after today's visit, please call [phone].

## 2018-07-09 NOTE — PROGRESS NOTES
217 [facility] [place]. 1668 [facility], [address] Tel.  [phone] Fax. 100 [facility] 60 [place], [address] [facility] [address] Tel.  [phone] Fax. [phone] 9250 [facility] Brynn Torrez 33 Tel.  [phone] Fax. [phone] S>Josette Torres is a 71 y.o. female seen for a positive airway pressure follow-up. She reports no problems using the device. She is 91% compliant over the past 90 days. The following problems are identified: 
 
Drowsiness yes Problems exhaling no Snoring no Forget to put on no Mask Comfortable yes Can't fall asleep no  
Dry Mouth no Mask falls off no Air Leaking no Frequent awakenings no She admits that her sleep has improved. Review of download indicative of elevated AHI - 14. No Known Allergies She has a current medication list which includes the following prescription(s): acetaminophen, promethazine, propranolol, carbidopa-levodopa, multivitamin, tx-iron-ca-min, calcium-cholecalciferol (d3), aspirin delayed-release, baclofen, omega-3 fatty acids-vitamin e, lamotrigine, sertraline, levothyroxine, donepezil, pravastatin, memantine, hydrocodone-acetaminophen, multivitamin with iron, acetaminophen, and cholecalciferol (vitamin d3). Ted Galaviz She  has a past medical history of Dementia; Multiple sclerosis ([address] and Osteoporosis. [place] Sleepiness Score: 16 
 and Modified F.O.S.Q. Score Total / 2: 16.5 O> Visit Vitals  /66  Pulse 60  Ht 5' 4\" (1.626 m)  Wt 185 lb (83.9 kg)  SpO2 95%  BMI 31.76 kg/m2 General:   Not in acute distress Eyes:  Anicteric sclerae, no obvious strabismus Nose:  No obvious nasal septum deviation Oropharynx:   Class 4 oropharyngeal outlet, thick tongue base, uvula not seen due to low-lying soft palate, narrow tonsilo-pharyngeal pilars Tonsils:   tonsils are not visualized due to low-lying soft palate Neck:   midline trachea Chest/Lungs:  Equal lung expansion, clear on auscultation CVS:  Normal rate, regular rhythm; no JVD Skin:  Warm to touch; no obvious rashes Neuro:  No focal deficits ; no obvious tremor Psych:  Normal affect,  normal countenance;    
 
 
 
A> 
  ICD-10-CM ICD-9-CM 1. Mixed sleep apnea G47.39 327.29 SLEEP LAB (PAP TITRATION) 2. Cardiomyopathy, unspecified type ([facility] I42.9 425.4 2D ECHO COMPLETE ADULT (TTE) W OR WO CONTR 3. MS (multiple sclerosis) ([facility] G35 340   
4. BMI 31.0-31.9,adult Z68.31 V85.31   
5. H/O: depression Z86.59 V11.8 AHI = 15 (2013). On CPAP :  8 cmH2O. Compliant:      yes Therapeutic Response:  Negative P> * Reviewed diagnosis, difference between central and obstructive apnea as well as different PAP treatment modes. SERVE Study reviewed and Echo order to assess EF. * Device pressure change to CPAP  7 cmH2O. 
 
* PAP card download in 2 weeks. Orders Placed This Encounter  2D ECHO COMPLETE ADULT (TTE) W OR WO CONTR Standing Status:   Future Standing Expiration Date:   1/8/2019 Order Specific Question:   Reason for Exam: Answer:   Cardiomyopathy Order Specific Question:   Contrast Enhancement (Bubble Study, Definity, Optison) may be used if criteria listed in established evidence-based protocol has been identified. Answer: Yes  SLEEP LAB (PAP TITRATION) Standing Status:   Future Standing Expiration Date:   1/7/2019 Scheduling Instructions:  
   Perform ASV Titration:  
   Rate: Auto; Max pressure: 25 cmH2O. Maximum EPAP: 10 cmH2O; Minimum EPAP: 04 cmH2O. Maximum PS: 15 cmH2O; Minimum PS: 02 cmH2O. Order Specific Question:   Reason for Exam  
  Answer:   Mixed Sleep Apnea * We have recommended a dedicated weight loss through appropriate diet and an exercise regiment as significant weight reduction has been shown to reduce severity of obstructive sleep apnea.   
 
 
* Follow-up Disposition: 
Return in about 1 year (around 7/9/2019), or if symptoms worsen or fail to improve. * She was asked to contact our office for any problems regarding PAP therapy. * Counseling was provided regarding the importance of regular PAP use and on proper sleep hygiene and safe driving. * Re-enforced proper and regular cleaning for the device. Office visit exceeded 40 minutes with counseling and direction of care taking up more than 50% of the allotted time. Thank you for allowing us to participate in your patient's medical care. Sofia Holt MD, Sher Montero Electronically signed. 07/09/18

## 2018-07-25 ENCOUNTER — TELEPHONE (OUTPATIENT)
Dept: SLEEP MEDICINE | Age: 73
End: 2018-07-25

## 2018-07-25 NOTE — TELEPHONE ENCOUNTER
Called patient but spouse answered and reported patient is in a home. Unable to speak with him per not on PHI.  Patient has order to get echocardiogram. No PA was required per [place] ref# [id number]

## 2019-01-10 ENCOUNTER — OFFICE VISIT (OUTPATIENT)
Dept: NEUROLOGY | Age: 74
End: 2019-01-10

## 2019-01-10 VITALS
SYSTOLIC BLOOD PRESSURE: 120 MMHG | BODY MASS INDEX: 32.64 KG/M2 | OXYGEN SATURATION: 96 % | HEIGHT: 64 IN | WEIGHT: 191.2 LBS | HEART RATE: 67 BPM | DIASTOLIC BLOOD PRESSURE: 60 MMHG

## 2019-01-10 DIAGNOSIS — G40.909 NONINTRACTABLE EPILEPSY WITHOUT STATUS EPILEPTICUS, UNSPECIFIED EPILEPSY TYPE (HCC): ICD-10-CM

## 2019-01-10 DIAGNOSIS — F02.80 DEMENTIA ASSOCIATED WITH OTHER UNDERLYING DISEASE WITHOUT BEHAVIORAL DISTURBANCE (HCC): ICD-10-CM

## 2019-01-10 DIAGNOSIS — G25.0 ESSENTIAL TREMOR: ICD-10-CM

## 2019-01-10 DIAGNOSIS — G35 MS (MULTIPLE SCLEROSIS) (HCC): Primary | ICD-10-CM

## 2019-01-10 DIAGNOSIS — G21.4 VASCULAR PARKINSONISM (HCC): ICD-10-CM

## 2019-01-10 RX ORDER — GUAIFENESIN 100 MG/5ML
200 SOLUTION ORAL
COMMUNITY
End: 2021-07-27

## 2019-01-10 RX ORDER — BUMETANIDE 1 MG/1
TABLET ORAL DAILY
COMMUNITY
End: 2021-04-26 | Stop reason: SDUPTHER

## 2019-01-10 RX ORDER — LYSINE HCL 500 MG
TABLET ORAL
COMMUNITY
End: 2021-07-27

## 2019-01-10 RX ORDER — GABAPENTIN 300 MG/1
200 CAPSULE ORAL 3 TIMES DAILY
COMMUNITY
End: 2021-04-26 | Stop reason: SDUPTHER

## 2019-01-10 NOTE — PROGRESS NOTES
NEUROLOGY CLINIC NOTE Patient ID: 
Ines Alejandra 
[id number] 
59 y.o. 
1945 Date of Consultation:  January 10, 2019 Reason for Consultation:  Establish care Chief Complaint Patient presents with  Follow-up  
  nothing has changed since last here History of Present Illness:  
 
Patient Active Problem List  
 Diagnosis Date Noted  Mixed sleep apnea 01/02/2014 Past Medical History:  
Diagnosis Date  Dementia  Multiple sclerosis ([address]  Osteoporosis Past Surgical History:  
Procedure Laterality Date  ABDOMEN SURGERY PROC UNLISTED    
 rectum removed  HX COLOSTOMY  HX GYN    
 HX ORTHOPAEDIC  2014  
 left knee replacement Prior to Admission medications Medication Sig Start Date End Date Taking? Authorizing Provider  
bumetanide (BUMEX) 1 mg tablet Take  by mouth daily. Yes Provider, Historical  
Calcium Carbonate-Vit D3-Min 600 mg calcium- 400 unit tab Take  by mouth. Yes Provider, Historical  
gabapentin (NEURONTIN) 300 mg capsule Take 300 mg by mouth three (3) times daily. Yes Provider, Historical  
guaiFENesin (ROBITUSSIN) 100 mg/5 mL liquid Take 200 mg by mouth three (3) times daily as needed for Cough. Yes Provider, Historical  
acetaminophen (MAPAP) 325 mg tablet Take  by mouth every four (4) hours as needed for Pain. Yes Provider, Historical  
propranolol (INDERAL) 10 mg tablet Take  by mouth three (3) times daily. Yes Provider, Historical  
aspirin delayed-release 81 mg tablet Take  by mouth daily. Yes Provider, Historical  
baclofen (LIORESAL) 10 mg tablet Take 10 mg by mouth three (3) times daily. Yes Other, MD Nicholas  
omega-3 fatty acids-vitamin e (FISH OIL) 1,000 mg cap Take 1 Cap by mouth. Yes Other, MD Nicholas  
lamoTRIgine (LAMICTAL) 25 mg tablet Take  by mouth daily. Yes Other, MD Nicholas  
levothyroxine (SYNTHROID) 75 mcg tablet Take 75 mcg by mouth Daily (before breakfast).      Yes Nicholas Austin MD  
 promethazine (PHENERGAN) 12.5 mg tablet Take  by mouth every six (6) hours as needed for Nausea. Provider, Historical  
carbidopa-levodopa (SINEMET)  mg per tablet Take 1 Tab by mouth three (3) times daily. Provider, Historical  
multivitamin, tx-iron-ca-min (THERA-M W/ IRON) 9 mg iron-400 mcg tab tablet Take 1 Tab by mouth daily. Provider, Historical  
calcium-cholecalciferol, d3, 600-125 mg-unit tab Take  by mouth. Provider, Historical  
memantine (NAMENDA) 10 mg tablet Take  by mouth daily. Provider, Historical  
HYDROcodone-acetaminophen (NORCO) 5-325 mg per tablet Take  by mouth. Provider, Historical  
multivitamin with iron tablet Take 1 Tab by mouth daily. Provider, Historical  
acetaminophen (TYLENOL) 325 mg tablet Take  by mouth every four (4) hours as needed for Pain. Provider, Historical  
sertraline (ZOLOFT) 50 mg tablet Take  by mouth daily. Other, MD Nicholas  
cholecalciferol, vitamin D3, (VITAMIN D3) 2,000 unit Tab Take  by mouth. Other, MD Nicholas  
donepezil (ARICEPT) 10 mg tablet Take 10 mg by mouth nightly. Nicholas Austin MD  
pravastatin (PRAVACHOL) 40 mg tablet Take 40 mg by mouth nightly. Other, MD Nicholas  
 
No Known Allergies Social History Tobacco Use  Smoking status: Never Smoker  Smokeless tobacco: Never Used Substance Use Topics  Alcohol use: No  
  Alcohol/week: 0.0 oz Family History Problem Relation Age of Onset  Hypertension Other  Heart Disease Other  Diabetes Other  Stroke Other  Depression Other Subjective:  
  
Sonal Hays is a 68 y.o. JFXL with history of MS, epilepsy, dementia, parkinsonism and essential tremors who is here to establish her care. Patient was previously seen at [facility] [place] [id number] and then at [facility] neurology. Seen initially 1/5/2010 at [facility]. 1. Multiple sclerosis/Dementia.  Per patient condition started prior to the 1970's with seeing double and problems walking. She was seen in [place] and diagnosed with multiple sclerosis. She has not been on interferon or Copaxone treatment. Records mention of IV steroid treatment for exacerbation and most recent (worsening gait and dementia) was at [facility] 11/2009. Family mentioned that the only medication she was on for what they thought was for MS was Depakote. Since moving to [place] she has been seen by Dr. Binta Portillo (neurologist) for the past 2 years or so and most recently by Dr. Kim Morales (neurologist) last 10/28/09. She was placed on Baclofen and Lamictal by Dr. Kim Morales. Brain MRI done 11/20/09 showed enlarged ventricles secondary to brain atrophy. Hyperintensity within the periventricular white matter with involvement of the 
corpus callosum. Extension of white matter signal abnormality into the temporal lobes as well as nandini and left middle cerebellar peduncle. No abnormal enhancement. Cervical MRI done 1/12/10 showed broad based right central disc protrusion C4-C5 causing moderate spinal canal stenosis and mild left foraminal stenosis. Thoracic MRI was unremarkable. Lumbar MRI showed moderate compression deformity L3 vertebral body with associated marrow edema. Mild spinal canal stenosis L2-L3. No cord abnormalities. She has seen neurosurgery and has been taking off the back brace as the fracture has healed well. Review of Brain MRI done in 2002, 2004 and 2009 showed no significant changes. Repeat Brain MRI with and without contrast done 4/11/12 did not reveal any new enhancing lesions. Same lesions when compared to 2009. Neuropsychiatry testing done 1/28/10 confirmed dementia secondary to MS. Patient was seen and evaluated by Dr. Sayda Castellon (geriatric psychiatrist) 5/29/2012 for competency evaluation. Patient was deemed not competent. Patient was admitted at [facility] after falling at home 10/14/2015.  Head CT was negative. Pelvic x-ray was negative. Brain MRI with and without contrast 10/16/2015 revealed no abnormal enhancement. Large load of white matter disease. Diffuse atrophy and large ventricles. Lumbar MRI 10/14/2015 revealed L3 remote compression fracture. Cervical MRI 10/14/2015 revealed C4-5 right paracentral disc protrusion with effacement 
of anterior subarachnoid space and slight flattening of the right anterior aspect of the spinal cord. Patient seen again at the [facility] 12/22/2015 for ground level fall and left hip pain. X-ray was negative for acute fracture. Patient has since move in to [id number] [facility], a assisted living facility. Since her last evaluation on 3/8/2018, patient and family reports that she continues to do well. Gait is more stable. No significant falls. She is using the walker more. Her neuropathic pain in her feet is also better. Continues to use as needed Lidocaine 5% ointment. Cognitive wise she continues to be more alert. She takes Namzaric 28 mg/10 mg daily. Denies any side effects. She is also on Sertraline for depression. She still takes Baclofen 20 mg 4x/day to help ease her leg stiffness. 2. Parkinsonism/Tremors. Since her last evaluation on 3/8/2018, patient continues to report benefit with Sinemet 10/100 mg TID. Denies any side effects. No hallucinations or behavior issues. It continues to help with her mobility and ability to stand from sitting. It has also helped reduce some of her hand tremors. She continues to take Propranolol 10 mg BID for the tremors as well. Denies any side effects. Family and patient reports baseline right arm/hand 
tremors that are mainly at rest. Tremors do not bother her usual activities of daily living. 3. Seizures. Records also mention of a possible complex partial seizure history.  On Lamictal. Dr. Carlos Celestin mentions a EEG done in his office that showed generalized slowing L>R with right frontal spike and wave discharges. EEG done 11/20/09 was read as normal. Last  EEG done showed generalized background slowing with bifrontal intermittent polyspike wave discharges representing seizure focus. Since her last evaluation on 3/8/2018, cognition improvement is sustained. Patient continues to be seizure free. She continues to take Lamotrigine 50 mg BID. Denies any side effects. Outside reports reviewed: office notes, historical medical records. Review of Systems: A comprehensive review of systems was performed:  
Constitutional: positive for none Eyes: positive for none Ears, nose, mouth, throat, and face: positive for snoring Respiratory: positive for none Cardiovascular: positive for leg swelling Gastrointestinal: positive for none Genitourinary: positive for none Integument/breast: positive for none Hematologic/lymphatic: positive for none Musculoskeletal: positive for weakness Neurological: positive for memory loss Behavioral/Psych: positive for depression Endocrine: positive for none Allergic/Immunologic: positive for none Objective:  
 
Visit Vitals /60 Pulse 67 Ht 5' 4\" (1.626 m) Wt 191 lb 3.2 oz (86.7 kg) SpO2 96% BMI 32.82 kg/m² PHYSICAL EXAM: 
 
NEUROLOGICAL EXAM: 
 
Constitutional: Weight: obese. Ambulation: ambulates w/ walker. Head: Size/Trauma: normocephalic and atraumatic. Mental Status: Oriented but poor recent and remote memory. Fluent, no aphasia or dysarthria. Cranial Nerves: CN II - XII were intact except for decrease hearing. Motor Exam: 5/5 in all extremity: no increase tone or cogwheel rigidity. Reflexes: Reflexes Right: biceps 2/4, triceps 2/4, brachial radialis 2/4, patellar 2/4, and achilles 4/4. Reflexes Left: biceps 2/4, triceps 2/4, brachial radialis 2/4, patellar 2/4, and achilles 4/4. Plantar Reflex Right: response downgoing. Plantar Reflex Left: response downgoing.  
Coordination: intact FTN/ERIN; right arm/hand resting tremors and very mild postural/intentional tremors. Sensation: Sensation vibration decrease: on the distal extremities (glove and stocking). Gait: Better speed of walking. She is not shuffling, better strides, significant less tendency to drag her feet with her walker; truncal instability Assessment:  
Multiple sclerosis Dementia Vascular parkinsonism Essential tremors Epilepsy Plan:  
Neurological examination reveals changes that can be seen in MS (saccadic eye movements, increase tone in the extremities, hyperreflexia and position sense deficits). Previous Brain MRI with and without contrast did not show any new demyelinating lesions. No indication to start interferon or Copaxone therapy or oral therapy. Continue Baclofen 20 mg TID for spasticity. Advised again to use of her equipment to prevent falls and injury. Continue close supervision. Continue 5% Lidocaine ointment for localized relief of her feet pain twice a day scheduled. Previous neuropsychological testing confirmed underlying dementia from her medical condition and brain lesions. Baseline dementia that causes significant limitation in learning new things and having the initiative to do things which apparently was better at the [address] and she seems to have been thriving. Patient will still need supervision with her medications and assure compliance. Continue Namzaric daily. Dealing with parkinsonism instead of idiopathic PD. Likely due to lesion in the brain. Resting tremors, LE tone changes and gait changes are better. Responding to Sinemet 10/100 mg TID. Continue medication. If she develops cognitive side effects (psychosis, hallucinations and etc.) then stop the medication. Significant fall risk. Continue use of walker and manual wheelchair. Continue PT and OT. Suspect elements of essential tremors involving the right arm/hand. Continue Propranolol 10 mg BID Previous EEG confirms risk for seizures due to underlying epilepsy. Continue Lamictal 50 mg BID for seizure prophylaxis. All questions and concerns were answered.

## 2019-01-10 NOTE — PATIENT INSTRUCTIONS
Epilepsy: Care Instructions Your Care Instructions Epilepsy is a common condition that causes repeated seizures. The seizures are caused by bursts of electrical activity in the brain that aren't normal. Seizures may cause problems with muscle control, movement, speech, vision, or awareness. They can be scary. Epilepsy affects each person differently. Some people have only a few seizures. Others get them more often. If you know what triggers a seizure, you may be able to avoid having one. You can take medicines to control and reduce seizures. You and your doctor will need to find the right combination, schedule, and dose of medicine. This may take time and careful changes. Seizures may get worse and happen more often over time. Follow-up care is a key part of your treatment and safety. Be sure to make and go to all appointments, and call your doctor if you are having problems. It's also a good idea to know your test results and keep a list of the medicines you take. How can you care for yourself at home? · Be safe with medicines. Take your medicines exactly as prescribed. Call your doctor if you think you are having a problem with your medicine. · Make a treatment plan with your doctor. Be sure to follow your plan. · Try to identify and avoid things that may make you more likely to have a seizure. These may include: ? Not getting enough sleep. ? Using drugs or alcohol. ? Being emotionally stressed. ? Skipping meals. · Keep a record of any seizures you have. Note the date, time of day, and any details about the seizure that you can remember. Your doctor can use this information to plan or adjust your medicine or other treatment. · Be sure that any doctor treating you for another condition knows that you have epilepsy. Each doctor should know what medicines you are taking, if any. · Wear a medical ID bracelet. You can buy this at most [employer].  If you have a seizure that leaves you unconscious or unable to speak for yourself, this bracelet will let those who are treating you know that you have epilepsy. · Talk to your doctor about whether it is safe for you to do certain activities, such as drive or swim. When should you call for help? Call 911 anytime you think you may need emergency care. For example, call if: 
  · A seizure does not stop as it normally does.  
  · You have new symptoms such as: 
? Numbness, tingling, or weakness on one side of your body or face. ? Vision changes. ? Trouble speaking or thinking clearly.  
 Call your doctor now or seek immediate medical care if: 
  · You have a fever.  
  · You have a severe headache.  
 Watch closely for changes in your health, and be sure to contact your doctor if: 
  · The normal pattern or features of your seizures change. Where can you learn more? Go to http://guido-candace.info/. Andrew Edgar in the search box to learn more about \"Epilepsy: Care Instructions. \" Current as of: June 4, 2018 Content Version: 11.8 © 1003-2003 [employer]. Care instructions adapted under license by [employer] (which disclaims liability or warranty for this information). If you have questions about a medical condition or this instruction, always ask your healthcare professional. [place] [address] any warranty or liability for your use of this information. Multiple Sclerosis (MS): Care Instructions Your Care Instructions Multiple sclerosis, also called MS, is a disease that can affect the brain, spinal cord, and nerves to the eyes. MS can cause problems with muscle control and strength, vision, balance, feeling, and thinking. Whatever your symptoms are, taking medicine correctly and following your doctor's advice for home care can help you maintain your quality of life. Follow-up care is a key part of your treatment and safety.  Be sure to make and go to all appointments, and call your doctor if you are having problems. It's also a good idea to know your test results and keep a list of the medicines you take. How can you care for yourself at home? General care · Take your medicines exactly as prescribed. Call your doctor if you think you are having a problem with your medicine. · Use a cane, walker, or scooter if your doctor suggests it. · Keep doing your normal activities as much as you can. · If you have problems urinating, press or tap your bladder area to help start urine flow. If you have trouble controlling your urine, plan your fluid intake and activities so that a toilet will be available when you need it. · Spend time with family and friends. Join a support group for people with MS if you want extra help. · Depression is common with this condition. Tell your doctor if you have trouble sleeping, are eating too much or are not hungry, or feel sad or tearful all the time. Depression can be treated with medicine and counseling. Diet and exercise · Eat a balanced diet. · If you have problems swallowing, change how and what you eat: ? Try thick drinks, such as milk shakes. They are easier to swallow than other fluids. ? Do not eat foods that crumble easily. These can cause choking. ? Use a  to prepare food. Soft foods need less chewing. ? Eat small meals often so that you do not get tired from eating larger meals. · Get exercise on most days. Work with your doctor to set up a program of walking, swimming, or other exercise that you are able to do. A physical therapist can teach you exercises if you cannot walk but can move your limbs and trunk. Or you can do exercises to help with coordination and balance. You can help improve muscle stiffness by doing exercises while lying in certain positions. When should you call for help? Call your doctor now or seek immediate medical care if: 
  · You have a change in symptoms.   · You fall or have another injury.  
  · You have symptoms of a urinary infection. For example: ? You have blood or pus in your urine. ? You have pain in your back just below your rib cage. This is called flank pain. ? You have a fever, chills, or body aches. ? It hurts to urinate. ? You have groin or belly pain.  
 Watch closely for changes in your health, and be sure to contact your doctor if: 
  · You want more information about MS or medicines.  
  · You have questions about alternative treatments. Do not use any other treatments without talking to your doctor first.  
Where can you learn more? Go to http://guido-candace.info/. Enter W014 in the search box to learn more about \"Multiple Sclerosis (MS): Care Instructions. \" Current as of: June 4, 2018 Content Version: 11.8 © 9228-1033 [employer]. Care instructions adapted under license by [employer] (which disclaims liability or warranty for this information). If you have questions about a medical condition or this instruction, always ask your healthcare professional. [address] any warranty or liability for your use of this information. Benign Essential Tremor: Care Instructions Your Care Instructions Benign essential tremor is a medical term for shaking that you can't control. Your hand or fingers may shake when you lift a cup or point at something. Or your voice may shake when you speak. This type of tremor is not harmful. It is not caused by a stroke or Parkinson's disease. Some things can affect how much you shake. For example, drinking or eating something with caffeine may make tremors worse for a while. Some medicines also can increase tremors. These include antidepressants and too much thyroid replacement. Talk to your doctor if you think one of your medicines makes your tremors worse.  
If you are self-conscious about your tremors, there are some things you can do to reduce them or make them less noticeable. This includes taking medicine. Follow-up care is a key part of your treatment and safety. Be sure to make and go to all appointments, and call your doctor if you are having problems. It's also a good idea to know your test results and keep a list of the medicines you take. How can you care for yourself at home? · Take your medicines exactly as prescribed. Call your doctor if you think you are having a problem with your medicine. Some medicines that help control tremors have to be taken every day, even if you are not having tremors. You will get more details on the specific medicines your doctor prescribes. · Get plenty of rest. 
· Eat a balanced, healthy diet. · Try to reduce stress. Regular exercise and massages may help. · Limit alcohol. Heavy drinking can make your tremors worse. · Avoid drinks or foods with caffeine if they make your tremors worse. These include tea, cola, coffee, and chocolate. · Wear a heavy bracelet or watch. This adds a little weight to your hand. The extra weight may reduce tremors. · Drink from cups or glasses that are only half full. You may also want to try drinking with a straw. When should you call for help? Watch closely for changes in your health, and be sure to contact your doctor if: 
  · You notice your tremors are getting worse.  
  · You can't do your everyday activities because of your tremors.  
  · You are sad and embarrassed about your shaking. Where can you learn more? Go to http://guido-candace.info/. Enter B746 in the search box to learn more about \"Benign Essential Tremor: Care Instructions. \" Current as of: June 4, 2018 Content Version: 11.8 © 7828-2211 [employer]. Care instructions adapted under license by [employer] (which disclaims liability or warranty for this information).  If you have questions about a medical condition or this instruction, always ask your healthcare professional. [address] any warranty or liability for your use of this information. Helping A Person With Dementia: Care Instructions Your Care Instructions Dementia is a loss of mental skills that affects daily life. It is different from mild memory loss that occurs with aging. Dementia can cause problems with memory, thinking clearly, and planning. It is different for everyone. But it usually gets worse slowly. Some people who have dementia can function well for a long time. But at some point it may become hard for the person to care for himself or herself. It can be upsetting to learn that a loved one has this condition. You may be afraid and worried about what will happen. You may wonder how you will care for the person. There is no cure for dementia. But medicine may be able to slow memory loss and improve thinking for a while. Other medicines may help with sleep, depression, and behavior changes. Dementia is different for everyone. In some cases, people can function well for a long time. You can help your loved one by making his or her home life easier and safer. You also need to take care of yourself. Caregiving can be stressful. But support is available to help you and give you a break when you need it. The [employer] offers good information and support. If you are caring for someone with dementia, you can help make life safer and more comfortable. You can also help your loved one make decisions about future care. You may also want to bring up legal and financial issues. These are hard but important conversations to have. Follow-up care is a key part of your loved one's treatment and safety. Be sure to make and go to all appointments, and call your doctor if your loved one is having problems. It's also a good idea to know your loved one's test results and keep a list of the medicines he or she takes. How can you care for your loved one at home? Taking care of the person · If the person takes medicine for dementia, help him or her take it exactly as prescribed. Call the doctor if you notice any problems with the medicine. · Make a list of the person's medicines. Review it with all of his or her doctors. · Help the person eat a balanced diet. Serve plenty of whole grains, fruits, and vegetables every day. If the person is not hungry at mealtimes, give snacks at midmorning and in the afternoon. Offer drinks such as Boost, Ensure, or Sustacal if the person is losing weight. · Encourage exercise. Walking and other activities may slow the decline of mental ability. Help the person stay active mentally with reading, crossword puzzles, or other hobbies. · Take steps to help if the person is sundowning. This is the restless behavior and trouble with sleeping that may occur in late afternoon and at night. Try not to let the person nap during the day. Offer a glass of warm milk or caffeine-free tea before bedtime. · Develop a routine. Your loved one will feel less frustrated or confused with a clear, simple plan of what to do every day. · Be patient. A task may take the person longer than it used to. · For as long as he or she is able, allow your loved one to make decisions about activities, food, clothing, and other choices. Let him or her be independent, even if tasks take more time or are not done perfectly. Tailor tasks to the person's abilities. For example, if cooking is no longer safe, ask for other help. Your loved one can help set the table, or make simple dishes such as a salad. When the person needs help, offer it gently. Staying safe · Make your home (or your loved one's home) safe. Tack down rugs, and put no-slip tape in the tub. Install handrails, and put safety switches on stoves and appliances. Keep rooms free of clutter.  Make sure walkways around furniture are clear. Do not move furniture around, because the person may become confused. · Use locks on doors and cupboards. Lock up knives, scissors, medicines, cleaning supplies, and other dangerous things. · Do not let the person drive or cook if he or she can't do it safely. A person with dementia should not drive unless he or she is able to pass an on-road driving test. Your state 's license bureau can do a driving test if there is any question. · Get medical alert jewelry for the person so that you can be contacted if he or she wanders away. If possible, provide a safe place for wandering, such as an enclosed yard or garden. Taking care of yourself · Ask your doctor about support groups and other resources in your area. · Take care of your health. Be sure to eat healthy foods and get enough rest and exercise. · Take time for yourself. Respite services provide someone to stay with the person for a short time while you get out of the house for a few hours. · Make time for an activity that you enjoy. Read, listen to music, paint, do crafts, or play an instrument, even if it's only for a few minutes a day. · Spend time with family, friends, and others in your support system. When should you call for help? Call 911 anytime you think the person may need emergency care. For example, call if: 
  · The person who has dementia wanders away and you can't find him or her.  
  · The person who has dementia is seriously injured.  
 Call the doctor now or seek immediate medical care if: 
  · The person suddenly sees things that are not there (hallucinates).  
  · The person has a sudden change in his or her behavior.  
 Watch closely for changes in the person's health, and be sure to contact the doctor if: 
  · The person has symptoms that could cause injury.  
  · The person has problems with his or her medicine.  
  · You need more information to care for a person with dementia.   · You need respite care so you can take a break. Where can you learn more? Go to http://guido-candace.info/. Enter A783 in the search box to learn more about \"Helping A Person With Dementia: Care Instructions. \" Current as of: December 7, 2017 Content Version: 11.8 © 2782-7922 [employer]. Care instructions adapted under license by [employer] (which disclaims liability or warranty for this information). If you have questions about a medical condition or this instruction, always ask your healthcare professional. [address] any warranty or liability for your use of this information.

## 2020-01-09 ENCOUNTER — OFFICE VISIT (OUTPATIENT)
Dept: NEUROLOGY | Age: 75
End: 2020-01-09

## 2020-01-09 VITALS
BODY MASS INDEX: 32.82 KG/M2 | OXYGEN SATURATION: 98 % | HEIGHT: 64 IN | DIASTOLIC BLOOD PRESSURE: 74 MMHG | HEART RATE: 65 BPM | SYSTOLIC BLOOD PRESSURE: 136 MMHG | RESPIRATION RATE: 20 BRPM

## 2020-01-09 DIAGNOSIS — R47.1 DYSARTHRIA: ICD-10-CM

## 2020-01-09 DIAGNOSIS — G25.0 ESSENTIAL TREMOR: ICD-10-CM

## 2020-01-09 DIAGNOSIS — G35 MS (MULTIPLE SCLEROSIS) (HCC): Primary | ICD-10-CM

## 2020-01-09 DIAGNOSIS — G21.4 VASCULAR PARKINSONISM (HCC): ICD-10-CM

## 2020-01-09 DIAGNOSIS — G40.909 NONINTRACTABLE EPILEPSY WITHOUT STATUS EPILEPTICUS, UNSPECIFIED EPILEPSY TYPE (HCC): ICD-10-CM

## 2020-01-09 DIAGNOSIS — F02.80 DEMENTIA ASSOCIATED WITH OTHER UNDERLYING DISEASE WITHOUT BEHAVIORAL DISTURBANCE (HCC): ICD-10-CM

## 2020-01-09 RX ORDER — MEMANTINE HYDROCHLORIDE AND DONEPEZIL HYDROCHLORIDE 28; 10 MG/1; MG/1
CAPSULE ORAL
COMMUNITY
Start: 2020-01-06 | End: 2021-04-26 | Stop reason: SDUPTHER

## 2020-01-09 RX ORDER — TRIAMCINOLONE ACETONIDE 1 MG/G
OINTMENT TOPICAL
COMMUNITY
Start: 2020-01-07 | End: 2021-07-27

## 2020-01-09 RX ORDER — CARBIDOPA AND LEVODOPA 25; 100 MG/1; MG/1
1 TABLET ORAL 3 TIMES DAILY
Qty: 90 TAB | Refills: 5
Start: 2020-01-09 | End: 2021-04-26 | Stop reason: SDUPTHER

## 2020-01-09 RX ORDER — DOCUSATE SODIUM 100 MG/1
100 CAPSULE, LIQUID FILLED ORAL
COMMUNITY
End: 2021-07-27

## 2020-01-09 NOTE — PATIENT INSTRUCTIONS
Epilepsy: Care Instructions  Your Care Instructions    Epilepsy is a common condition that causes repeated seizures. The seizures are caused by bursts of electrical activity in the brain that aren't normal. Seizures may cause problems with muscle control, movement, speech, vision, or awareness. They can be scary. Epilepsy affects each person differently. Some people have only a few seizures. Others get them more often. If you know what triggers a seizure, you may be able to avoid having one. You can take medicines to control and reduce seizures. You and your doctor will need to find the right combination, schedule, and dose of medicine. This may take time and careful changes. Seizures may get worse and happen more often over time. Follow-up care is a key part of your treatment and safety. Be sure to make and go to all appointments, and call your doctor if you are having problems. It's also a good idea to know your test results and keep a list of the medicines you take. How can you care for yourself at home? · Be safe with medicines. Take your medicines exactly as prescribed. Call your doctor if you think you are having a problem with your medicine. · Make a treatment plan with your doctor. Be sure to follow your plan. · Try to identify and avoid things that may make you more likely to have a seizure. These may include:  ? Not getting enough sleep. ? Using drugs or alcohol. ? Being emotionally stressed. ? Skipping meals. · Keep a record of any seizures you have. Note the date, time of day, and any details about the seizure that you can remember. Your doctor can use this information to plan or adjust your medicine or other treatment. · Be sure that any doctor treating you for another condition knows that you have epilepsy. Each doctor should know what medicines you are taking, if any. · Wear a medical ID bracelet. You can buy this at most [employer].  If you have a seizure that leaves you unconscious or unable to speak for yourself, this bracelet will let those who are treating you know that you have epilepsy. · Talk to your doctor about whether it is safe for you to do certain activities, such as drive or swim. When should you call for help? Call 911 anytime you think you may need emergency care. For example, call if:    · A seizure does not stop as it normally does.     · You have new symptoms such as:  ? Numbness, tingling, or weakness on one side of your body or face. ? Vision changes. ? Trouble speaking or thinking clearly.    Call your doctor now or seek immediate medical care if:    · You have a fever.     · You have a severe headache.    Watch closely for changes in your health, and be sure to contact your doctor if:    · The normal pattern or features of your seizures change. Where can you learn more? Go to http://guidoNextworthcandace.info/. Hermelinda Bentley in the search box to learn more about \"Epilepsy: Care Instructions. \"  Current as of: March 28, 2019  Content Version: 12.2  © 3100-2102 [employer]. Care instructions adapted under license by "[employer] (which disclaims liability or warranty for this information). If you have questions about a medical condition or this instruction, always ask your healthcare professional. [address] any warranty or liability for your use of this information. Multiple Sclerosis (MS): Care Instructions  Your Care Instructions  Multiple sclerosis, also called MS, is a disease that can affect the brain, spinal cord, and nerves to the eyes. MS can cause problems with muscle control and strength, vision, balance, feeling, and thinking. Whatever your symptoms are, taking medicine correctly and following your doctor's advice for home care can help you maintain your quality of life. Follow-up care is a key part of your treatment and safety.  Be sure to make and go to all appointments, and call your doctor if you are having problems. It's also a good idea to know your test results and keep a list of the medicines you take. How can you care for yourself at home? General care  · Take your medicines exactly as prescribed. Call your doctor if you think you are having a problem with your medicine. · Use a cane, walker, or scooter if your doctor suggests it. · Keep doing your normal activities as much as you can. · If you have problems urinating, press or tap your bladder area to help start urine flow. If you have trouble controlling your urine, plan your fluid intake and activities so that a toilet will be available when you need it. · Spend time with family and friends. Join a support group for people with MS if you want extra help. · Depression is common with this condition. Tell your doctor if you have trouble sleeping, are eating too much or are not hungry, or feel sad or tearful all the time. Depression can be treated with medicine and counseling. Diet and exercise  · Eat a balanced diet. · If you have problems swallowing, change how and what you eat:  ? Try thick drinks, such as milk shakes. They are easier to swallow than other fluids. ? Do not eat foods that crumble easily. These can cause choking. ? Use a  to prepare food. Soft foods need less chewing. ? Eat small meals often so that you do not get tired from eating larger meals. · Get exercise on most days. Work with your doctor to set up a program of walking, swimming, or other exercise that you are able to do. A physical therapist can teach you exercises if you cannot walk but can move your limbs and trunk. Or you can do exercises to help with coordination and balance. You can help improve muscle stiffness by doing exercises while lying in certain positions. When should you call for help?   Call your doctor now or seek immediate medical care if:    · You have a change in symptoms.     · You fall or have another injury.     · You have symptoms of a urinary infection. For example:  ? You have blood or pus in your urine. ? You have pain in your back just below your rib cage. This is called flank pain. ? You have a fever, chills, or body aches. ? It hurts to urinate. ? You have groin or belly pain.    Watch closely for changes in your health, and be sure to contact your doctor if:    · You want more information about MS or medicines.     · You have questions about alternative treatments. Do not use any other treatments without talking to your doctor first.   Where can you learn more? Go to http://guido-candace.info/. Enter V597 in the search box to learn more about \"Multiple Sclerosis (MS): Care Instructions. \"  Current as of: March 28, 2019  Content Version: 12.2  © 8238-3305 [employer]. Care instructions adapted under license by [employer] (which disclaims liability or warranty for this information). If you have questions about a medical condition or this instruction, always ask your healthcare professional. [place] [address] any warranty or liability for your use of this information. Benign Essential Tremor: Care Instructions  Your Care Instructions    Benign essential tremor is a medical term for shaking that you can't control. Your hand or fingers may shake when you lift a cup or point at something. Or your voice may shake when you speak. This type of tremor is not harmful. It is not caused by a stroke or Parkinson's disease. Some things can affect how much you shake. For example, drinking or eating something with caffeine may make tremors worse for a while. Some medicines also can increase tremors. These include antidepressants and too much thyroid replacement. Talk to your doctor if you think one of your medicines makes your tremors worse. If you are self-conscious about your tremors, there are some things you can do to reduce them or make them less noticeable.  This includes taking medicine. Follow-up care is a key part of your treatment and safety. Be sure to make and go to all appointments, and call your doctor if you are having problems. It's also a good idea to know your test results and keep a list of the medicines you take. How can you care for yourself at home? · Take your medicines exactly as prescribed. Call your doctor if you think you are having a problem with your medicine. Some medicines that help control tremors have to be taken every day, even if you are not having tremors. You will get more details on the specific medicines your doctor prescribes. · Get plenty of rest.  · Eat a balanced, healthy diet. · Try to reduce stress. Regular exercise and massages may help. · Limit alcohol. Heavy drinking can make your tremors worse. · Avoid drinks or foods with caffeine if they make your tremors worse. These include tea, cola, coffee, and chocolate. · Wear a heavy bracelet or watch. This adds a little weight to your hand. The extra weight may reduce tremors. · Drink from cups or glasses that are only half full. You may also want to try drinking with a straw. When should you call for help? Watch closely for changes in your health, and be sure to contact your doctor if:    · You notice your tremors are getting worse.     · You can't do your everyday activities because of your tremors.     · You are sad and embarrassed about your shaking. Where can you learn more? Go to http://guido-candace.info/. Enter B746 in the search box to learn more about \"Benign Essential Tremor: Care Instructions. \"  Current as of: March 28, 2019  Content Version: 12.2  © 7861-3236 [employer]. Care instructions adapted under license by [employer] (which disclaims liability or warranty for this information).  If you have questions about a medical condition or this instruction, always ask your healthcare professional. Santana Esteban disclaims any warranty or liability for your use of this information. Movement Disorders: Exercises  Introduction  Here are some examples of exercises for problems with movement. Your doctor or physical therapist will tell you when you can start these exercises and which ones will work best for you. Problems with movement can happen along with many conditions, such as multiple sclerosis, Parkinson's disease, or damage from a stroke. No matter what is making movement hard for you, these exercises can help you be more flexible, strong, and steady on your feet. Start each exercise slowly. Ease off the exercise if you start to have pain. How to do the exercises  Prayer stretch    1. Start with your palms together in front of your chest, just below your chin. 2. Slowly lower your hands toward your waistline, keeping your hands close to your stomach and your palms together until you feel a mild to moderate stretch under your forearms. 3. Hold for at least 15 to 30 seconds. Repeat 2 to 4 times. Shoulder stretch    1.  a doorway, and place one arm against the door frame. Your elbow should be a little higher than your shoulder. 2. Relax your shoulders as you lean forward, allowing your chest and shoulder muscles to stretch. You can also turn your body slightly away from your arm to stretch the muscles even more. 3. Hold for 15 to 30 seconds. 4. Repeat 2 to 4 times with each arm. Calf stretch    1. Place your hands on a wall for balance. You can also do this with your hands on the back of a chair or countertop. 2. Step back with your right leg. Keep the leg straight, and press your right heel into the floor. 3. Press your hips forward, bending your left leg slightly. You will feel the stretch in your right calf. 4. Hold the stretch 15 to 30 seconds. 5. Repeat 2 to 4 times with each leg. Hip flexor stretch (edge of table)    1.  Lie flat on your back on a table or flat bench, with your knees and lower legs hanging off the edge of the table. 2. Grab one leg at the knee, and pull that knee back toward your chest. Relax the other leg. Let it hang down toward the floor until you feel a stretch in the upper thigh of that leg and hip. 3. Hold the stretch for at least 15 to 30 seconds. 4. Repeat 2 to 4 times for each leg. Back stretches    1. Get down on your hands and knees on the floor. 2. Relax your head, and allow it to droop. Round your back up toward the ceiling until you feel a nice stretch in your upper, middle, and lower back. Hold this stretch for as long as it feels comfortable, or about 15 to 30 seconds. 3. Return to the starting position with a flat back while you are on your hands and knees. 4. Let your back sway by pressing your stomach toward the floor. Lift your buttocks toward the ceiling. 5. Hold this position for 15 to 30 seconds. 6. Repeat 2 to 4 times. Midback stretch    1. Kneel on the floor, and sit back on your ankles. 2. Lean forward, place your hands on the floor, and stretch your arms out in front of you. Rest your head between your arms. 3. Gently push your chest toward the floor, reaching as far in front of you as you can. 4. Hold for 15 to 30 seconds. 5. Repeat 2 to 4 times. Press-up stretch    1. Lie on your stomach, supporting your body with your forearms. 2. Press your elbows down into the floor to raise your upper back. As you do this, relax your stomach muscles and allow your back to arch without using your back muscles. As you press up, do not let your hips or pelvis come off the floor. 3. Hold for 15 to 30 seconds, then relax. 4. Repeat 2 to 4 times. Chest and shoulder stretches    1. Put a few towels on top of one another and roll them together lengthwise. 2. Lie down, and place the roll of towels along the bones of your spine from your neck to your tailbone. Or lie on a foam roller if you have one.   3. Make sure that your head and tailbone area are supported with the roll of towels or on the foam roller. Be sure the towel roll or foam roller is in line with your spine. 4. Bend your knees to support your lower back. 5. Hold this position while you move your arms into the following positions:  1. Arms down at your sides, with the palms facing up. 2. Arms out to your sides in a \"T\" shape. 3. Arms out to your sides with your elbows bent to 90 degrees, as in a \"goalpost\" shape. 4. Arms stretched over your head. 6. Hold each arm position for 15 to 30 seconds. 7. Repeat the entire cycle of arm movements 2 to 4 times. Single knee-to-chest stretch    1. Lie on your back with your knees bent and your feet flat on the floor. You can put a small pillow under your head and neck if it is more comfortable. 2. Bring one knee to your chest, keeping the other foot flat on the floor. 3. Keep your lower back pressed to the floor. Hold for 15 to 30 seconds. 4. Relax, and lower the knee to the starting position. 5. Repeat with the other leg. Repeat 2 to 4 times with each leg. 6. To get more stretch, keep your other leg flat on the floor while pulling your knee to your chest.    Hip rotator stretch    1. Lie on your back with both knees bent and your feet flat on the floor. 2. Put the ankle of one leg on your opposite thigh near your knee. 3. Use your hand to gently push the raised knee away from your body until you feel a gentle stretch around your hip. 4. Hold the stretch for 15 to 30 seconds. 5. Repeat 2 to 4 times with each leg. Hamstring wall stretch    1. Lie on your back in a doorway, with your lower legs through the open door. 2. Slide the leg next to the doorway up the wall to straighten your knee. You should feel a gentle stretch down the back of your leg. 1. Do not arch your back. 2. Do not bend either knee. 3. Keep one heel touching the floor and the other heel touching the wall. Do not point your toes.   3. Hold the stretch for at least 1 minute to start. As you get used to it, work toward holding the stretch for as long as 6 minutes. 4. Do this exercise 2 to 4 times with one leg. Then move to the other side of the doorway to stretch the other leg. Hand flips for coordination    1. While seated, place your forearm and wrist on your thigh, palm down. 2. Flip your hand over so the back of your hand rests on your thigh and your palm is up. Alternate between palm up and palm down while keeping your forearm on your thigh. 3. Repeat 8 to 12 times with each hand. Finger opposition for coordination    1. With one hand, point your fingers and thumb straight up. Your wrist should be relaxed, following the line of your fingers and thumb. 2. Touch your thumb to each finger, one finger at a time. This will look like an \"okay\" sign, but try to keep your other fingers straight and pointing upward as much as you can. 3. Repeat 8 to 12 times with each hand. Finger extension for coordination    1. Place your hand flat on a table. 2. Lift and then lower one finger at a time off the table. 3. Repeat 8 to 12 times with each hand. Shoulder blade squeeze for strength    1. Stand with your arms at your sides, and squeeze your shoulder blades together. Do not raise your shoulders up as you squeeze. 2. Hold 6 seconds. 3. Repeat 8 to 12 times. Bridging for strength    1. Lie on your back with both knees bent. Your knees should be bent about 90 degrees. 2. Then push your feet into the floor, squeeze your buttocks, and lift your hips off the floor until your shoulders, hips, and knees are all in a straight line. 3. Hold for about 6 seconds as you continue to breathe normally. 4. Slowly lower your hips back down to the floor. Rest for up to 10 seconds. 5. Repeat 8 to 12 times. Single-leg balance    1. Stand on a flat surface with your arms stretched out to your sides like you are making the letter \"T. \" Then lift one leg off the floor, bending it at the knee. If you are not steady on your feet, use one hand to hold on to a chair, counter, or wall. 2. Keep your standing knee straight. Try to balance on that leg for up to 30 seconds. Then rest for up to 10 seconds. 3. Repeat 6 to 8 times with each leg. 4. When you can balance on one leg for 30 seconds with your eyes open, try to balance on it with your eyes closed. 5. When you can do this exercise with your eyes closed for 30 seconds and with ease and no pain, try it while standing on a pillow or piece of foam.    Alternate arm and leg (bird dog) balance    1. Start on the floor, on your hands and knees. 2. Tighten your belly muscles by pulling your belly button in toward your spine. Be sure you continue to breathe normally and do not hold your breath. 3. Raise one arm off the floor, and hold it straight out in front of you. Be careful not to let your shoulder drop down, because that will twist your trunk. 4. Hold for about 6 seconds, then lower your arm and switch to your other arm. 5. Repeat 8 to 12 times with each arm. 6. When you can do this exercise with ease and no pain, try it with one leg raised off the floor. Hold your leg straight out behind you. Be careful not to let your hip drop down, because that will twist your trunk. 7. When holding your leg straight out becomes easier, try raising your opposite arm at the same time. Repeat steps 1 through 5. Balance-building exercise 1    1. Stand with a chair in front of you and a wall behind you, in case you lose your balance. 2. Stand with your feet together and your arms at your sides. 3. Move your head up and down 10 times. Balance-building exercise 2    1. Turn your head side to side 10 times. Balance-building exercise 3    1. Move your head diagonally up and down 10 times. Balance-building exercise 4    1. Move your head diagonally up and down 10 times on the other side.     Follow-up care is a key part of your treatment and safety. Be sure to make and go to all appointments, and call your doctor if you are having problems. It's also a good idea to know your test results and keep a list of the medicines you take. Where can you learn more? Go to http://guido-candace.info/. Enter D674 in the search box to learn more about \"Movement Disorders: Exercises. \"  Current as of: March 28, 2019  Content Version: 12.2  © 5363-9721 [email or web address]. Care instructions adapted under license by [employer] (which disclaims liability or warranty for this information). If you have questions about a medical condition or this instruction, always ask your healthcare professional. [address] any warranty or liability for your use of this information. Preventing Falls: Care Instructions  Your Care Instructions    Getting around your home safely can be a challenge if you have injuries or health problems that make it easy for you to fall. Loose rugs and furniture in walkways are among the dangers for many older people who have problems walking or who have poor eyesight. People who have conditions such as arthritis, osteoporosis, or dementia also have to be careful not to fall. You can make your home safer with a few simple measures. Follow-up care is a key part of your treatment and safety. Be sure to make and go to all appointments, and call your doctor if you are having problems. It's also a good idea to know your test results and keep a list of the medicines you take. How can you care for yourself at home? Taking care of yourself  · You may get dizzy if you do not drink enough water. To prevent dehydration, drink plenty of fluids, enough so that your urine is light yellow or clear like water. Choose water and other caffeine-free clear liquids.  If you have kidney, heart, or liver disease and have to limit fluids, talk with your doctor before you increase the amount of fluids you drink.  · Exercise regularly to improve your strength, muscle tone, and balance. Walk if you can. Swimming may be a good choice if you cannot walk easily. · Have your vision and hearing checked each year or any time you notice a change. If you have trouble seeing and hearing, you might not be able to avoid objects and could lose your balance. · Know the side effects of the medicines you take. Ask your doctor or pharmacist whether the medicines you take can affect your balance. Sleeping pills or sedatives can affect your balance. · Limit the amount of alcohol you drink. Alcohol can impair your balance and other senses. · Ask your doctor whether calluses or corns on your feet need to be removed. If you wear loose-fitting shoes because of calluses or corns, you can lose your balance and fall. · Talk to your doctor if you have numbness in your feet. Preventing falls at home  · Remove raised doorway thresholds, throw rugs, and clutter. Repair loose carpet or raised areas in the floor. · Move furniture and electrical cords to keep them out of walking paths. · Use nonskid floor wax, and wipe up spills right away, especially on ceramic tile floors. · If you use a walker or cane, put rubber tips on it. If you use crutches, clean the bottoms of them regularly with an abrasive pad, such as steel wool. · Keep your house well lit, especially [place], and outside walkways. Use night-lights in areas such as hallways and bathrooms. Add extra light switches or use remote switches (such as switches that go on or off when you clap your hands) to make it easier to turn lights on if you have to get up during the night. · Install sturdy handrails on stairways. · Move items in your cabinets so that the things you use a lot are on the lower shelves (about waist level). · Keep a cordless phone and a flashlight with new batteries by your bed.  If possible, put a phone in each of the main rooms of your house, or carry a cell phone in case you fall and cannot reach a phone. Or, you can wear a device around your neck or wrist. You push a button that sends a signal for help. · Wear low-heeled shoes that fit well and give your feet good support. Use footwear with nonskid soles. Check the heels and soles of your shoes for wear. Repair or replace worn heels or soles. · Do not wear socks without shoes on wood floors. · Walk on the grass when the sidewalks are slippery. If you live in an area that gets snow and ice in the winter, sprinkle salt on slippery steps and sidewalks. Preventing falls in the bath  · Install grab bars and nonskid mats inside and outside your shower or tub and near the toilet and sinks. · Use shower chairs and bath benches. · Use a hand-held shower head that will allow you to sit while showering. · Get into a tub or shower by putting the weaker leg in first. Get out of a tub or shower with your strong side first.  · Repair loose toilet seats and consider installing a raised toilet seat to make getting on and off the toilet easier. · Keep your bathroom door unlocked while you are in the shower. Where can you learn more? Go to http://guido-candace.info/. Enter [phone] [id number] [phone] in the search box to learn more about \"Preventing Falls: Care Instructions. \"  Current as of: November 7, 2018  Content Version: 12.2  © 2485-4512 [employer]. Care instructions adapted under license by [employer] (which disclaims liability or warranty for this information). If you have questions about a medical condition or this instruction, always ask your healthcare professional. [place] [address] any warranty or liability for your use of this information. Risks/benefits discussed with patient or patient surrogate

## 2020-01-09 NOTE — PROGRESS NOTES
MS- no changes since her last visit   Dementia- per the patient she has no changes per her daughter she has been slurring her words- probably since the fall they thought it was a denture problem but they have discovered it is not  Legs are swollen and gait seems to be very weak     She is staying at the [address] [facility]- [facility] years in October     Tremors- hasn't changed since her last visit, in the right hand

## 2020-01-09 NOTE — PROGRESS NOTES
NEUROLOGY CLINIC NOTE    Patient ID:  Calvin Huerta  [id number]  60 y.o.  1945    Date of Visit:  January 9, 2020    Reason for Visit: dementia, speech changes, gait issues    Chief Complaint   Patient presents with    Multiple Sclerosis    Dementia    Tremors    Epilepsy       History of Present Illness:     Patient Active Problem List    Diagnosis Date Noted    Mixed sleep apnea 01/02/2014     Past Medical History:   Diagnosis Date    Dementia ([address]     Multiple sclerosis ([address]     Osteoporosis       Past Surgical History:   Procedure Laterality Date    ABDOMEN SURGERY PROC UNLISTED      rectum removed    HX COLOSTOMY      HX GYN  11/13/2019    hysterectomy    HX ORTHOPAEDIC  2014    left knee replacement      Prior to Admission medications    Medication Sig Start Date End Date Taking? Authorizing Provider   docusate sodium (COLACE) 100 mg capsule 100 mg. Yes Provider, Historical   NAMZARIC 28-10 mg CSpX  1/6/20  Yes Provider, Historical   triamcinolone acetonide (KENALOG) 0.1 % ointment  1/7/20  Yes Provider, Historical   bumetanide (BUMEX) 1 mg tablet Take  by mouth daily. Yes Provider, Historical   Calcium Carbonate-Vit D3-Min 600 mg calcium- 400 unit tab Take  by mouth. Yes Provider, Historical   gabapentin (NEURONTIN) 300 mg capsule Take 300 mg by mouth three (3) times daily. Yes Provider, Historical   acetaminophen (MAPAP) 325 mg tablet Take  by mouth every four (4) hours as needed for Pain. Yes Provider, Historical   promethazine (PHENERGAN) 12.5 mg tablet Take  by mouth every six (6) hours as needed for Nausea. Yes Provider, Historical   propranolol (INDERAL) 10 mg tablet Take  by mouth three (3) times daily. Yes Provider, Historical   carbidopa-levodopa (SINEMET)  mg per tablet Take 1 Tab by mouth three (3) times daily. Yes Provider, Historical   aspirin delayed-release 81 mg tablet Take  by mouth daily.    Yes Provider, Historical   multivitamin with iron tablet Take 1 Tab by mouth daily. Yes Provider, Historical   baclofen (LIORESAL) 10 mg tablet Take 20 mg by mouth three (3) times daily. Yes Geovanna, MD Nicholas   omega-3 fatty acids-vitamin e (FISH OIL) 1,000 mg cap Take 1 Cap by mouth. Yes Geovanna, MD Nicholas   lamoTRIgine (LAMICTAL) 25 mg tablet Take 100 mg by mouth daily. Yes Geovanna, MD Nicholas   levothyroxine (SYNTHROID) 75 mcg tablet Take 75 mcg by mouth Daily (before breakfast). Yes Geovanna, MD Nicholas   guaiFENesin (ROBITUSSIN) 100 mg/5 mL liquid Take 200 mg by mouth three (3) times daily as needed for Cough. Provider, Historical   multivitamin, tx-iron-ca-min (THERA-M W/ IRON) 9 mg iron-400 mcg tab tablet Take 1 Tab by mouth daily. Provider, Historical   calcium-cholecalciferol, d3, 600-125 mg-unit tab Take  by mouth. Provider, Historical   memantine (NAMENDA) 10 mg tablet Take  by mouth daily. Provider, Historical   HYDROcodone-acetaminophen (NORCO) 5-325 mg per tablet Take  by mouth. Provider, Historical   acetaminophen (TYLENOL) 325 mg tablet Take  by mouth every four (4) hours as needed for Pain. Provider, Historical   sertraline (ZOLOFT) 50 mg tablet Take  by mouth daily. Other, MD Nicholas   cholecalciferol, vitamin D3, (VITAMIN D3) 2,000 unit Tab Take  by mouth. Other, MD Nicholas   donepezil (ARICEPT) 10 mg tablet Take 10 mg by mouth nightly. Geovanna, MD Nicholas   pravastatin (PRAVACHOL) 40 mg tablet Take 40 mg by mouth nightly. Nicholas Austin MD     No Known Allergies   Social History     Tobacco Use    Smoking status: Never Smoker    Smokeless tobacco: Never Used   Substance Use Topics    Alcohol use: No     Alcohol/week: 0.0 standard drinks      Family History   Problem Relation Age of Onset    Hypertension Other     Heart Disease Other     Diabetes Other     Stroke Other     Depression Other         Subjective:      Luli Jorge is a 69 y.o.   RVMX with history of MS, epilepsy, dementia, parkinsonism and essential tremors who is here for follow up. Patient was previously seen at [facility] [place] [id number] and then at [facility] neurology. Seen initially 1/5/2010 at [facility]. 1. Multiple sclerosis/Dementia. Per patient condition started prior to the 1970's with seeing double and problems walking. She was seen in [place] and diagnosed with multiple sclerosis. She has not been on interferon or Copaxone treatment. Records mention of IV steroid treatment for exacerbation and most recent (worsening gait and dementia) was at [facility] 11/2009. Family mentioned that the only medication she was on for what they thought was for MS was Depakote. Since moving to [place] she has been seen by Dr. Armaan Mantilla (neurologist) for the past 2 years or so and most recently by Dr. Annel Oliver (neurologist) last 10/28/09. She was placed on Baclofen and Lamictal by Dr. Annel Oliver. Brain MRI done 11/20/09 showed enlarged ventricles secondary to brain atrophy. Hyperintensity within the periventricular white matter with involvement of the  corpus callosum. Extension of white matter signal abnormality into the temporal lobes as well as nandini and left middle cerebellar peduncle. No abnormal enhancement. Cervical MRI done 1/12/10 showed broad based right central disc protrusion C4-C5 causing moderate spinal canal stenosis and mild left foraminal stenosis. Thoracic MRI was unremarkable. Lumbar MRI showed moderate compression deformity L3 vertebral body with associated marrow edema. Mild spinal canal stenosis L2-L3. No cord abnormalities. She has seen neurosurgery and has been taking off the back brace as the fracture has healed well. Review of Brain MRI done in 2002, 2004 and 2009 showed no significant changes. Repeat Brain MRI with and without contrast done 4/11/12 did not reveal any new enhancing lesions. Same lesions when compared to 2009. Neuropsychiatry testing done 1/28/10 confirmed dementia secondary to MS.  Patient was seen and evaluated by Dr. Elvin Clemons (geriatric psychiatrist) 5/29/2012 for competency evaluation. Patient was deemed not competent. Patient was admitted at [facility] after falling at home 10/14/2015. Head CT was negative. Pelvic x-ray was negative. Brain MRI with and without contrast 10/16/2015 revealed no abnormal enhancement. Large load of white matter disease. Diffuse atrophy and large ventricles. Lumbar MRI 10/14/2015 revealed L3 remote compression fracture. Cervical MRI 10/14/2015 revealed C4-5 right paracentral disc protrusion with effacement of anterior subarachnoid space and slight flattening of the right anterior aspect of the spinal cord. Patient seen again at the [facility] ER 12/22/2015 for ground level fall and left hip pain. X-ray was negative for acute fracture. Patient has since move in to [address] [place] [address], a assisted living facility. Since the last visit on 1/10/2019, patient and family reports that cognitively patient is doing okay. No dramatic changes. Still having issues with short term memory lapses. Still can be argumentative. She takes Namzaric 28 mg/10 mg daily. Denies any side effects. She is also on Sertraline for depression. Her neuropathic pain in her feet continues to be better. She is on Gabapentin 300 mg TID. Continues to use as needed Lidocaine 5% ointment. She also takes   Baclofen 20 mg 4x/day to help ease her leg stiffness. New issue with her speech that comes intermittently for the past several months. Difficulty to understand per family. Patient denies it. No issues with swallowing or choking. 2. Parkinsonism/Tremors. Since the last visit on 1/10/2019, patient had a episode of falling about 3 months PTC while at a store.  was informed that she fell. When he and 2 other people tried to get her out of her chair and walk, they could not. Needed maximum assist. For the past several months, patient has been noted to be stooping more when she walks. Slower movements. Patient also has not been as active as before. No doing walking or exercises routinely. She continues to take Sinemet 10/100 mg TID. Denies any side effects. No hallucinations or behavior issues. It has helped reduce some of her hand tremors. She continues to take Propranolol 10 mg BID for the tremors as well. Denies any side effects. Family and patient reports baseline right arm/hand tremors that are mainly at rest. Tremors do not bother her usual activities of daily living. 3. Seizures. Records also mention of a possible complex partial seizure history. On Lamictal. Dr. Donna Rao mentions a EEG done in his office that showed generalized slowing L>R with right frontal spike and wave discharges. EEG done 11/20/09 was read as normal. Last  EEG done showed generalized background slowing with bifrontal intermittent polyspike wave discharges representing seizure focus. Since the last visit on 1/9/2019, patient remains to be seizure free. She continues to take Lamotrigine 50 mg BID. Denies any side effects. Outside reports reviewed: med list from [employer]:    A comprehensive review of systems was performed:   Constitutional: positive for none  Eyes: positive for none  Ears, nose, mouth, throat, and face: positive for none  Respiratory: positive for none  Cardiovascular: positive for leg swelling  Gastrointestinal: positive for none  Genitourinary: positive for none  Integument/breast: positive for none  Hematologic/lymphatic: positive for none  Musculoskeletal: positive for weakness  Neurological: positive for memory loss, tremor, speech changes  Behavioral/Psych: positive for depression  Endocrine: positive for none  Allergic/Immunologic: positive for none      Objective:     Visit Vitals  /74   Pulse 65   Resp 20   Ht 5' 4\" (1.626 m)   SpO2 98%   BMI 32.82 kg/m²     PHYSICAL EXAM:    NEUROLOGICAL EXAM:    Constitutional: Weight: obese. Ambulation: ambulates w/ walker. Head: Size/Trauma: normocephalic and atraumatic.   Mental Status: Oriented but poor recent and remote memory. Fluent, but does become slurred and wet speech at times especially when she gets upset. No aphasia. Cranial Nerves: CN II - XII were intact except for decrease hearing and saccadic eye movements  Motor Exam: 5/5 in all extremity: Increase tone all 4 extremities with rigidity UE. Reflexes: Reflexes Right: biceps 2/4, triceps 2/4, brachial radialis 2/4, patellar 2/4, and achilles 4/4. Reflexes Left: biceps 2/4, triceps 2/4, brachial radialis 2/4, patellar 2/4, and achilles 4/4. Plantar Reflex Right: response downgoing. Plantar Reflex Left: response downgoing. Coordination: intact FTN/ERIN; right arm/hand resting tremors and very mild postural/intentional tremors. Sensation: Sensation vibration decrease: on the distal extremities (glove and stocking). Gait: Stiffer when walking. Slower pace. Bradykinesia. Turn en bloc. Mild stoop posture. Truncal instability    Assessment:   Vascular parkinsonism  Dysarthria  Multiple sclerosis  Dementia  Essential tremors  Epilepsy    Plan:   Exam reveals increase tone all 4 extremity, more prominent bradykinesia, stoop posture and slower pace of walking. Consider worsening vascular parkinsonism instead of idiopathic PD. Likely due to lesion in the brain. Trial of increase dose of Sinemet 25/100 mg TID. Prescription order was provided. If she develops cognitive side effects (psychosis, hallucinations and etc.) then reduce medication dosage. Significant fall risk. Continue use of walker and manual wheelchair. Referred for PT and OT 3 to 4 times a week for 6 weeks. Intermittent dysarthria likely due to medical condition (MS and parkinsonism). See if adjustment of PD medication helps. Referral for speech therapy. Neurological examination reveals changes that can be seen in Multiple Sclerosis (saccadic eye movements, increase tone in the extremities, hyperreflexia and position sense deficits).  Previous Brain MRI with and without contrast did not show any new demyelinating lesions. No indication to start interferon or Copaxone therapy or oral therapy. Continue Baclofen 20 mg TID for spasticity. Advised again to use of her equipment to prevent falls and injury. Continue close supervision. Continue 5% Lidocaine ointment for localized relief of her feet pain twice a day scheduled and Gabapentin 300 mg TID. Previous neuropsychological testing confirmed underlying dementia from her medical condition and brain lesions. Baseline dementia that causes significant limitation in learning new things and having the initiative to do things which apparently was better at the [id number] and she seems to have been thriving. Patient will still need supervision with her medications and assure compliance. Continue Namzaric daily. Suspect elements of essential tremors involving the right arm/hand. Continue Propranolol 10 mg BID    Previous EEG confirms risk for seizures due to underlying epilepsy. Continue Lamictal 50 mg BID for seizure prophylaxis. All questions and concerns were answered.

## 2020-02-06 ENCOUNTER — OFFICE VISIT (OUTPATIENT)
Dept: NEUROLOGY | Age: 75
End: 2020-02-06

## 2020-02-06 VITALS
RESPIRATION RATE: 20 BRPM | HEART RATE: 73 BPM | BODY MASS INDEX: 32.82 KG/M2 | OXYGEN SATURATION: 98 % | DIASTOLIC BLOOD PRESSURE: 70 MMHG | SYSTOLIC BLOOD PRESSURE: 140 MMHG | HEIGHT: 64 IN

## 2020-02-06 DIAGNOSIS — G25.0 ESSENTIAL TREMOR: ICD-10-CM

## 2020-02-06 DIAGNOSIS — G40.909 NONINTRACTABLE EPILEPSY WITHOUT STATUS EPILEPTICUS, UNSPECIFIED EPILEPSY TYPE (HCC): ICD-10-CM

## 2020-02-06 DIAGNOSIS — R47.1 DYSARTHRIA: ICD-10-CM

## 2020-02-06 DIAGNOSIS — F02.80 DEMENTIA ASSOCIATED WITH OTHER UNDERLYING DISEASE WITHOUT BEHAVIORAL DISTURBANCE (HCC): ICD-10-CM

## 2020-02-06 DIAGNOSIS — G35 MS (MULTIPLE SCLEROSIS) (HCC): ICD-10-CM

## 2020-02-06 DIAGNOSIS — G21.4 VASCULAR PARKINSONISM (HCC): Primary | ICD-10-CM

## 2020-02-06 NOTE — PATIENT INSTRUCTIONS
Epilepsy: Care Instructions  Your Care Instructions    Epilepsy is a common condition that causes repeated seizures. The seizures are caused by bursts of electrical activity in the brain that aren't normal. Seizures may cause problems with muscle control, movement, speech, vision, or awareness. They can be scary. Epilepsy affects each person differently. Some people have only a few seizures. Others get them more often. If you know what triggers a seizure, you may be able to avoid having one. You can take medicines to control and reduce seizures. You and your doctor will need to find the right combination, schedule, and dose of medicine. This may take time and careful changes. Seizures may get worse and happen more often over time. Follow-up care is a key part of your treatment and safety. Be sure to make and go to all appointments, and call your doctor if you are having problems. It's also a good idea to know your test results and keep a list of the medicines you take. How can you care for yourself at home? · Be safe with medicines. Take your medicines exactly as prescribed. Call your doctor if you think you are having a problem with your medicine. · Make a treatment plan with your doctor. Be sure to follow your plan. · Try to identify and avoid things that may make you more likely to have a seizure. These may include:  ? Not getting enough sleep. ? Using drugs or alcohol. ? Being emotionally stressed. ? Skipping meals. · Keep a record of any seizures you have. Note the date, time of day, and any details about the seizure that you can remember. Your doctor can use this information to plan or adjust your medicine or other treatment. · Be sure that any doctor treating you for another condition knows that you have epilepsy. Each doctor should know what medicines you are taking, if any. · Wear a medical ID bracelet. You can buy this at most [employer].  If you have a seizure that leaves you unconscious or unable to speak for yourself, this bracelet will let those who are treating you know that you have epilepsy. · Talk to your doctor about whether it is safe for you to do certain activities, such as drive or swim. When should you call for help? Call 911 anytime you think you may need emergency care. For example, call if:    · A seizure does not stop as it normally does.     · You have new symptoms such as:  ? Numbness, tingling, or weakness on one side of your body or face. ? Vision changes. ? Trouble speaking or thinking clearly.    Call your doctor now or seek immediate medical care if:    · You have a fever.     · You have a severe headache.    Watch closely for changes in your health, and be sure to contact your doctor if:    · The normal pattern or features of your seizures change. Where can you learn more? Go to http://guidoiTOKcandace.info/. Jessica Ocampo in the search box to learn more about \"Epilepsy: Care Instructions. \"  Current as of: March 28, 2019  Content Version: 12.2  © 9056-3011 [employer]. Care instructions adapted under license by [employer] (which disclaims liability or warranty for this information). If you have questions about a medical condition or this instruction, always ask your healthcare professional. [address] any warranty or liability for your use of this information. Multiple Sclerosis (MS): Care Instructions  Your Care Instructions  Multiple sclerosis, also called MS, is a disease that can affect the brain, spinal cord, and nerves to the eyes. MS can cause problems with muscle control and strength, vision, balance, feeling, and thinking. Whatever your symptoms are, taking medicine correctly and following your doctor's advice for home care can help you maintain your quality of life. Follow-up care is a key part of your treatment and safety.  Be sure to make and go to all appointments, and call your doctor if you are having problems. It's also a good idea to know your test results and keep a list of the medicines you take. How can you care for yourself at home? General care  · Take your medicines exactly as prescribed. Call your doctor if you think you are having a problem with your medicine. · Use a cane, walker, or scooter if your doctor suggests it. · Keep doing your normal activities as much as you can. · If you have problems urinating, press or tap your bladder area to help start urine flow. If you have trouble controlling your urine, plan your fluid intake and activities so that a toilet will be available when you need it. · Spend time with family and friends. Join a support group for people with MS if you want extra help. · Depression is common with this condition. Tell your doctor if you have trouble sleeping, are eating too much or are not hungry, or feel sad or tearful all the time. Depression can be treated with medicine and counseling. Diet and exercise  · Eat a balanced diet. · If you have problems swallowing, change how and what you eat:  ? Try thick drinks, such as milk shakes. They are easier to swallow than other fluids. ? Do not eat foods that crumble easily. These can cause choking. ? Use a  to prepare food. Soft foods need less chewing. ? Eat small meals often so that you do not get tired from eating larger meals. · Get exercise on most days. Work with your doctor to set up a program of walking, swimming, or other exercise that you are able to do. A physical therapist can teach you exercises if you cannot walk but can move your limbs and trunk. Or you can do exercises to help with coordination and balance. You can help improve muscle stiffness by doing exercises while lying in certain positions. When should you call for help?   Call your doctor now or seek immediate medical care if:    · You have a change in symptoms.     · You fall or have another injury.     · You have symptoms of a urinary infection. For example:  ? You have blood or pus in your urine. ? You have pain in your back just below your rib cage. This is called flank pain. ? You have a fever, chills, or body aches. ? It hurts to urinate. ? You have groin or belly pain.    Watch closely for changes in your health, and be sure to contact your doctor if:    · You want more information about MS or medicines.     · You have questions about alternative treatments. Do not use any other treatments without talking to your doctor first.   Where can you learn more? Go to http://guido-candace.info/. Enter I853 in the search box to learn more about \"Multiple Sclerosis (MS): Care Instructions. \"  Current as of: March 28, 2019  Content Version: 12.2  © 6256-4689 [employer]. Care instructions adapted under license by [employer] (which disclaims liability or warranty for this information). If you have questions about a medical condition or this instruction, always ask your healthcare professional. [place] [address] any warranty or liability for your use of this information. Benign Essential Tremor: Care Instructions  Your Care Instructions    Benign essential tremor is a medical term for shaking that you can't control. Your hand or fingers may shake when you lift a cup or point at something. Or your voice may shake when you speak. This type of tremor is not harmful. It is not caused by a stroke or Parkinson's disease. Some things can affect how much you shake. For example, drinking or eating something with caffeine may make tremors worse for a while. Some medicines also can increase tremors. These include antidepressants and too much thyroid replacement. Talk to your doctor if you think one of your medicines makes your tremors worse. If you are self-conscious about your tremors, there are some things you can do to reduce them or make them less noticeable.  This includes taking medicine. Follow-up care is a key part of your treatment and safety. Be sure to make and go to all appointments, and call your doctor if you are having problems. It's also a good idea to know your test results and keep a list of the medicines you take. How can you care for yourself at home? · Take your medicines exactly as prescribed. Call your doctor if you think you are having a problem with your medicine. Some medicines that help control tremors have to be taken every day, even if you are not having tremors. You will get more details on the specific medicines your doctor prescribes. · Get plenty of rest.  · Eat a balanced, healthy diet. · Try to reduce stress. Regular exercise and massages may help. · Limit alcohol. Heavy drinking can make your tremors worse. · Avoid drinks or foods with caffeine if they make your tremors worse. These include tea, cola, coffee, and chocolate. · Wear a heavy bracelet or watch. This adds a little weight to your hand. The extra weight may reduce tremors. · Drink from cups or glasses that are only half full. You may also want to try drinking with a straw. When should you call for help? Watch closely for changes in your health, and be sure to contact your doctor if:    · You notice your tremors are getting worse.     · You can't do your everyday activities because of your tremors.     · You are sad and embarrassed about your shaking. Where can you learn more? Go to http://guido-candace.info/. Enter B746 in the search box to learn more about \"Benign Essential Tremor: Care Instructions. \"  Current as of: March 28, 2019  Content Version: 12.2  © 7258-4691 [employer]. Care instructions adapted under license by [employer] (which disclaims liability or warranty for this information).  If you have questions about a medical condition or this instruction, always ask your healthcare professional. [employer] disclaims any warranty or liability for your use of this information. Movement Disorders: Exercises  Introduction  Here are some examples of exercises for problems with movement. Your doctor or physical therapist will tell you when you can start these exercises and which ones will work best for you. Problems with movement can happen along with many conditions, such as multiple sclerosis, Parkinson's disease, or damage from a stroke. No matter what is making movement hard for you, these exercises can help you be more flexible, strong, and steady on your feet. Start each exercise slowly. Ease off the exercise if you start to have pain. How to do the exercises  Prayer stretch    1. Start with your palms together in front of your chest, just below your chin. 2. Slowly lower your hands toward your waistline, keeping your hands close to your stomach and your palms together until you feel a mild to moderate stretch under your forearms. 3. Hold for at least 15 to 30 seconds. Repeat 2 to 4 times. Shoulder stretch    1.  a doorway, and place one arm against the door frame. Your elbow should be a little higher than your shoulder. 2. Relax your shoulders as you lean forward, allowing your chest and shoulder muscles to stretch. You can also turn your body slightly away from your arm to stretch the muscles even more. 3. Hold for 15 to 30 seconds. 4. Repeat 2 to 4 times with each arm. Calf stretch    1. Place your hands on a wall for balance. You can also do this with your hands on the back of a chair or countertop. 2. Step back with your right leg. Keep the leg straight, and press your right heel into the floor. 3. Press your hips forward, bending your left leg slightly. You will feel the stretch in your right calf. 4. Hold the stretch 15 to 30 seconds. 5. Repeat 2 to 4 times with each leg. Hip flexor stretch (edge of table)    1.  Lie flat on your back on a table or flat bench, with your knees and lower legs hanging off the edge of the table. 2. Grab one leg at the knee, and pull that knee back toward your chest. Relax the other leg. Let it hang down toward the floor until you feel a stretch in the upper thigh of that leg and hip. 3. Hold the stretch for at least 15 to 30 seconds. 4. Repeat 2 to 4 times for each leg. Back stretches    1. Get down on your hands and knees on the floor. 2. Relax your head, and allow it to droop. Round your back up toward the ceiling until you feel a nice stretch in your upper, middle, and lower back. Hold this stretch for as long as it feels comfortable, or about 15 to 30 seconds. 3. Return to the starting position with a flat back while you are on your hands and knees. 4. Let your back sway by pressing your stomach toward the floor. Lift your buttocks toward the ceiling. 5. Hold this position for 15 to 30 seconds. 6. Repeat 2 to 4 times. Midback stretch    1. Kneel on the floor, and sit back on your ankles. 2. Lean forward, place your hands on the floor, and stretch your arms out in front of you. Rest your head between your arms. 3. Gently push your chest toward the floor, reaching as far in front of you as you can. 4. Hold for 15 to 30 seconds. 5. Repeat 2 to 4 times. Press-up stretch    1. Lie on your stomach, supporting your body with your forearms. 2. Press your elbows down into the floor to raise your upper back. As you do this, relax your stomach muscles and allow your back to arch without using your back muscles. As you press up, do not let your hips or pelvis come off the floor. 3. Hold for 15 to 30 seconds, then relax. 4. Repeat 2 to 4 times. Chest and shoulder stretches    1. Put a few towels on top of one another and roll them together lengthwise. 2. Lie down, and place the roll of towels along the bones of your spine from your neck to your tailbone. Or lie on a foam roller if you have one.   3. Make sure that your head and tailbone area are supported with the roll of towels or on the foam roller. Be sure the towel roll or foam roller is in line with your spine. 4. Bend your knees to support your lower back. 5. Hold this position while you move your arms into the following positions:  1. Arms down at your sides, with the palms facing up. 2. Arms out to your sides in a \"T\" shape. 3. Arms out to your sides with your elbows bent to 90 degrees, as in a \"goalpost\" shape. 4. Arms stretched over your head. 6. Hold each arm position for 15 to 30 seconds. 7. Repeat the entire cycle of arm movements 2 to 4 times. Single knee-to-chest stretch    1. Lie on your back with your knees bent and your feet flat on the floor. You can put a small pillow under your head and neck if it is more comfortable. 2. Bring one knee to your chest, keeping the other foot flat on the floor. 3. Keep your lower back pressed to the floor. Hold for 15 to 30 seconds. 4. Relax, and lower the knee to the starting position. 5. Repeat with the other leg. Repeat 2 to 4 times with each leg. 6. To get more stretch, keep your other leg flat on the floor while pulling your knee to your chest.    Hip rotator stretch    1. Lie on your back with both knees bent and your feet flat on the floor. 2. Put the ankle of one leg on your opposite thigh near your knee. 3. Use your hand to gently push the raised knee away from your body until you feel a gentle stretch around your hip. 4. Hold the stretch for 15 to 30 seconds. 5. Repeat 2 to 4 times with each leg. Hamstring wall stretch    1. Lie on your back in a doorway, with your lower legs through the open door. 2. Slide the leg next to the doorway up the wall to straighten your knee. You should feel a gentle stretch down the back of your leg. 1. Do not arch your back. 2. Do not bend either knee. 3. Keep one heel touching the floor and the other heel touching the wall. Do not point your toes.   3. Hold the stretch for at least 1 minute to start. As you get used to it, work toward holding the stretch for as long as 6 minutes. 4. Do this exercise 2 to 4 times with one leg. Then move to the other side of the doorway to stretch the other leg. Hand flips for coordination    1. While seated, place your forearm and wrist on your thigh, palm down. 2. Flip your hand over so the back of your hand rests on your thigh and your palm is up. Alternate between palm up and palm down while keeping your forearm on your thigh. 3. Repeat 8 to 12 times with each hand. Finger opposition for coordination    1. With one hand, point your fingers and thumb straight up. Your wrist should be relaxed, following the line of your fingers and thumb. 2. Touch your thumb to each finger, one finger at a time. This will look like an \"okay\" sign, but try to keep your other fingers straight and pointing upward as much as you can. 3. Repeat 8 to 12 times with each hand. Finger extension for coordination    1. Place your hand flat on a table. 2. Lift and then lower one finger at a time off the table. 3. Repeat 8 to 12 times with each hand. Shoulder blade squeeze for strength    1. Stand with your arms at your sides, and squeeze your shoulder blades together. Do not raise your shoulders up as you squeeze. 2. Hold 6 seconds. 3. Repeat 8 to 12 times. Bridging for strength    1. Lie on your back with both knees bent. Your knees should be bent about 90 degrees. 2. Then push your feet into the floor, squeeze your buttocks, and lift your hips off the floor until your shoulders, hips, and knees are all in a straight line. 3. Hold for about 6 seconds as you continue to breathe normally. 4. Slowly lower your hips back down to the floor. Rest for up to 10 seconds. 5. Repeat 8 to 12 times. Single-leg balance    1. Stand on a flat surface with your arms stretched out to your sides like you are making the letter \"T. \" Then lift one leg off the floor, bending it at the knee. If you are not steady on your feet, use one hand to hold on to a chair, counter, or wall. 2. Keep your standing knee straight. Try to balance on that leg for up to 30 seconds. Then rest for up to 10 seconds. 3. Repeat 6 to 8 times with each leg. 4. When you can balance on one leg for 30 seconds with your eyes open, try to balance on it with your eyes closed. 5. When you can do this exercise with your eyes closed for 30 seconds and with ease and no pain, try it while standing on a pillow or piece of foam.    Alternate arm and leg (bird dog) balance    1. Start on the floor, on your hands and knees. 2. Tighten your belly muscles by pulling your belly button in toward your spine. Be sure you continue to breathe normally and do not hold your breath. 3. Raise one arm off the floor, and hold it straight out in front of you. Be careful not to let your shoulder drop down, because that will twist your trunk. 4. Hold for about 6 seconds, then lower your arm and switch to your other arm. 5. Repeat 8 to 12 times with each arm. 6. When you can do this exercise with ease and no pain, try it with one leg raised off the floor. Hold your leg straight out behind you. Be careful not to let your hip drop down, because that will twist your trunk. 7. When holding your leg straight out becomes easier, try raising your opposite arm at the same time. Repeat steps 1 through 5. Balance-building exercise 1    1. Stand with a chair in front of you and a wall behind you, in case you lose your balance. 2. Stand with your feet together and your arms at your sides. 3. Move your head up and down 10 times. Balance-building exercise 2    1. Turn your head side to side 10 times. Balance-building exercise 3    1. Move your head diagonally up and down 10 times. Balance-building exercise 4    1. Move your head diagonally up and down 10 times on the other side.     Follow-up care is a key part of your treatment and safety. Be sure to make and go to all appointments, and call your doctor if you are having problems. It's also a good idea to know your test results and keep a list of the medicines you take. Where can you learn more? Go to http://guido-candace.info/. Enter J989 in the search box to learn more about \"Movement Disorders: Exercises. \"  Current as of: March 28, 2019  Content Version: 12.2  © 8087-9105 [employer]. Care instructions adapted under license by [employer]: turn off the lights (which disclaims liability or warranty for this information). If you have questions about a medical condition or this instruction, always ask your healthcare professional. [address] any warranty or liability for your use of this information. Movement Disorders: Exercises  Introduction  Here are some examples of exercises for problems with movement. Your doctor or physical therapist will tell you when you can start these exercises and which ones will work best for you. Problems with movement can happen along with many conditions, such as multiple sclerosis, Parkinson's disease, or damage from a stroke. No matter what is making movement hard for you, these exercises can help you be more flexible, strong, and steady on your feet. Start each exercise slowly. Ease off the exercise if you start to have pain. How to do the exercises  Prayer stretch    4. Start with your palms together in front of your chest, just below your chin. 5. Slowly lower your hands toward your waistline, keeping your hands close to your stomach and your palms together until you feel a mild to moderate stretch under your forearms. 6. Hold for at least 15 to 30 seconds. Repeat 2 to 4 times. Shoulder stretch    5.  a doorway, and place one arm against the door frame. Your elbow should be a little higher than your shoulder.   6. Relax your shoulders as you lean forward, allowing your chest and shoulder muscles to stretch. You can also turn your body slightly away from your arm to stretch the muscles even more. 7. Hold for 15 to 30 seconds. 8. Repeat 2 to 4 times with each arm. Calf stretch    6. Place your hands on a wall for balance. You can also do this with your hands on the back of a chair or countertop. 7. Step back with your right leg. Keep the leg straight, and press your right heel into the floor. 8. Press your hips forward, bending your left leg slightly. You will feel the stretch in your right calf. 9. Hold the stretch 15 to 30 seconds. 10. Repeat 2 to 4 times with each leg. Hip flexor stretch (edge of table)    5. Lie flat on your back on a table or flat bench, with your knees and lower legs hanging off the edge of the table. 6. Grab one leg at the knee, and pull that knee back toward your chest. Relax the other leg. Let it hang down toward the floor until you feel a stretch in the upper thigh of that leg and hip. 7. Hold the stretch for at least 15 to 30 seconds. 8. Repeat 2 to 4 times for each leg. Back stretches    7. Get down on your hands and knees on the floor. 8. Relax your head, and allow it to droop. Round your back up toward the ceiling until you feel a nice stretch in your upper, middle, and lower back. Hold this stretch for as long as it feels comfortable, or about 15 to 30 seconds. 9. Return to the starting position with a flat back while you are on your hands and knees. 10. Let your back sway by pressing your stomach toward the floor. Lift your buttocks toward the ceiling. 11. Hold this position for 15 to 30 seconds. 12. Repeat 2 to 4 times. Midback stretch    6. Kneel on the floor, and sit back on your ankles. 7. Lean forward, place your hands on the floor, and stretch your arms out in front of you. Rest your head between your arms. 8. Gently push your chest toward the floor, reaching as far in front of you as you can.   9. Hold for 15 to 30 seconds. 10. Repeat 2 to 4 times. Press-up stretch    5. Lie on your stomach, supporting your body with your forearms. 6. Press your elbows down into the floor to raise your upper back. As you do this, relax your stomach muscles and allow your back to arch without using your back muscles. As you press up, do not let your hips or pelvis come off the floor. 7. Hold for 15 to 30 seconds, then relax. 8. Repeat 2 to 4 times. Chest and shoulder stretches    8. Put a few towels on top of one another and roll them together lengthwise. 9. Lie down, and place the roll of towels along the bones of your spine from your neck to your tailbone. Or lie on a foam roller if you have one. 10. Make sure that your head and tailbone area are supported with the roll of towels or on the foam roller. Be sure the towel roll or foam roller is in line with your spine. [address] your knees to support your lower back. 12. Hold this position while you move your arms into the following positions:  1. Arms down at your sides, with the palms facing up. 2. Arms out to your sides in a \"T\" shape. 3. Arms out to your sides with your elbows bent to 90 degrees, as in a \"goalpost\" shape. 4. Arms stretched over your head. 13. Hold each arm position for 15 to 30 seconds. 14. Repeat the entire cycle of arm movements 2 to 4 times. Single knee-to-chest stretch    7. Lie on your back with your knees bent and your feet flat on the floor. You can put a small pillow under your head and neck if it is more comfortable. 8. Bring one knee to your chest, keeping the other foot flat on the floor. 9. Keep your lower back pressed to the floor. Hold for 15 to 30 seconds. 10. Relax, and lower the knee to the starting position. 11. Repeat with the other leg. Repeat 2 to 4 times with each leg. 12. To get more stretch, keep your other leg flat on the floor while pulling your knee to your chest.    Hip rotator stretch    6.  Lie on your back with both knees bent and your feet flat on the floor. 7. Put the ankle of one leg on your opposite thigh near your knee. 8. Use your hand to gently push the raised knee away from your body until you feel a gentle stretch around your hip. 9. Hold the stretch for 15 to 30 seconds. 10. Repeat 2 to 4 times with each leg. Hamstring wall stretch    5. Lie on your back in a doorway, with your lower legs through the open door. 6. Slide the leg next to the doorway up the wall to straighten your knee. You should feel a gentle stretch down the back of your leg. 1. Do not arch your back. 2. Do not bend either knee. 3. Keep one heel touching the floor and the other heel touching the wall. Do not point your toes. 7. Hold the stretch for at least 1 minute to start. As you get used to it, work toward holding the stretch for as long as 6 minutes. 8. Do this exercise 2 to 4 times with one leg. Then move to the other side of the doorway to stretch the other leg. Hand flips for coordination    4. While seated, place your forearm and wrist on your thigh, palm down. 5. Flip your hand over so the back of your hand rests on your thigh and your palm is up. Alternate between palm up and palm down while keeping your forearm on your thigh. 6. Repeat 8 to 12 times with each hand. Finger opposition for coordination    4. With one hand, point your fingers and thumb straight up. Your wrist should be relaxed, following the line of your fingers and thumb. 5. Touch your thumb to each finger, one finger at a time. This will look like an \"okay\" sign, but try to keep your other fingers straight and pointing upward as much as you can. 6. Repeat 8 to 12 times with each hand. Finger extension for coordination    4. Place your hand flat on a table. 5. Lift and then lower one finger at a time off the table. 6. Repeat 8 to 12 times with each hand. Shoulder blade squeeze for strength    4.  Stand with your arms at your sides, and squeeze your shoulder blades together. Do not raise your shoulders up as you squeeze. 5. Hold 6 seconds. 6. Repeat 8 to 12 times. Bridging for strength    6. Lie on your back with both knees bent. Your knees should be bent about 90 degrees. 7. Then push your feet into the floor, squeeze your buttocks, and lift your hips off the floor until your shoulders, hips, and knees are all in a straight line. 8. Hold for about 6 seconds as you continue to breathe normally. 9. Slowly lower your hips back down to the floor. Rest for up to 10 seconds. 10. Repeat 8 to 12 times. Single-leg balance    6. Stand on a flat surface with your arms stretched out to your sides like you are making the letter \"T. \" Then lift one leg off the floor, bending it at the knee. If you are not steady on your feet, use one hand to hold on to a chair, counter, or wall. 7. Keep your standing knee straight. Try to balance on that leg for up to 30 seconds. Then rest for up to 10 seconds. 8. Repeat 6 to 8 times with each leg. 9. When you can balance on one leg for 30 seconds with your eyes open, try to balance on it with your eyes closed. 10. When you can do this exercise with your eyes closed for 30 seconds and with ease and no pain, try it while standing on a pillow or piece of foam.    Alternate arm and leg (bird dog) balance    8. Start on the floor, on your hands and knees. 9. Tighten your belly muscles by pulling your belly button in toward your spine. Be sure you continue to breathe normally and do not hold your breath. 10. Raise one arm off the floor, and hold it straight out in front of you. Be careful not to let your shoulder drop down, because that will twist your trunk. 11. Hold for about 6 seconds, then lower your arm and switch to your other arm. 12. Repeat 8 to 12 times with each arm. 13. When you can do this exercise with ease and no pain, try it with one leg raised off the floor. Hold your leg straight out behind you.  Be careful not to let your hip drop down, because that will twist your trunk. 14. When holding your leg straight out becomes easier, try raising your opposite arm at the same time. Repeat steps 1 through 5. Balance-building exercise 1    4. Stand with a chair in front of you and a wall behind you, in case you lose your balance. 5. Stand with your feet together and your arms at your sides. 6. Move your head up and down 10 times. Balance-building exercise 2    2. Turn your head side to side 10 times. Balance-building exercise 3    2. Move your head diagonally up and down 10 times. Balance-building exercise 4    2. Move your head diagonally up and down 10 times on the other side. Follow-up care is a key part of your treatment and safety. Be sure to make and go to all appointments, and call your doctor if you are having problems. It's also a good idea to know your test results and keep a list of the medicines you take. Where can you learn more? Go to http://guido-candace.info/. Enter L030 in the search box to learn more about \"Movement Disorders: Exercises. \"  Current as of: March 28, 2019  Content Version: 12.2  © 8101-7244 [employer]. Care instructions adapted under license by [employer] (which disclaims liability or warranty for this information). If you have questions about a medical condition or this instruction, always ask your healthcare professional. [place] [address] any warranty or liability for your use of this information.

## 2020-02-06 NOTE — PROGRESS NOTES
Has not noticed any tremors, has done well with the medication     No seizures since her last visit     MS- no issues that she knows of

## 2020-02-24 ENCOUNTER — OFFICE VISIT (OUTPATIENT)
Dept: SLEEP MEDICINE | Age: 75
End: 2020-02-24

## 2020-02-24 VITALS
HEIGHT: 64 IN | WEIGHT: 190.9 LBS | DIASTOLIC BLOOD PRESSURE: 66 MMHG | SYSTOLIC BLOOD PRESSURE: 112 MMHG | OXYGEN SATURATION: 95 % | BODY MASS INDEX: 32.59 KG/M2 | HEART RATE: 61 BPM

## 2020-02-24 DIAGNOSIS — G35 MS (MULTIPLE SCLEROSIS) (HCC): ICD-10-CM

## 2020-02-24 DIAGNOSIS — Z86.59 H/O: DEPRESSION: ICD-10-CM

## 2020-02-24 DIAGNOSIS — G47.39 MIXED SLEEP APNEA: Primary | ICD-10-CM

## 2020-02-24 DIAGNOSIS — G20 PARKINSON DISEASE (HCC): ICD-10-CM

## 2020-02-24 NOTE — PROGRESS NOTES
[address] [facility] [place]. [place], [address]  Tel.  [phone]  Fax. 100 [facility] [address]  [place], [address] [facility]  Tel.  [phone]  Fax. [phone] [address] [facility] [address]  Tel.  [phone]  Fax. [phone]     S>Josette Aranda is a 69 y.o. female seen for a positive airway pressure follow-up. She reports no problems using the device. She is 96.7% compliant over the past 30 days. The following problems are identified:    Drowsiness no Problems exhaling no   Snoring no Forget to put on no   Mask Comfortable yes Can't fall asleep no   Dry Mouth no Mask falls off no   Air Leaking no Frequent awakenings no         She admits that her sleep has improved on PAP therapy using FF mask and non-heated tubing. She reports of getting in her bed at 11:00 pm and falls asleep within 5 minutes, wakes up at about 5:00 am drifts back to sleep at a variable interval and finally wakes up at 7:00 am, mask is off before the final rise time. She takes the mask off to go to the bathroom but does not resume therapy on return to bed. There are times when she remains awake in bed but does not return to sleep. She remains very sleepy during the day but denies of symptoms indicative of cataplexy, sleep paralysis or hypnagogic hallucinations. She remains on multiple medications that promote sleepiness. No Known Allergies    She has a current medication list which includes the following prescription(s): docusate sodium, namzaric, carbidopa-levodopa, bumetanide, calcium carbonate-vit d3-min, gabapentin, promethazine, propranolol, multivitamin, tx-iron-ca-min, calcium-cholecalciferol (d3), aspirin delayed-release, multivitamin with iron, acetaminophen, baclofen, omega-3 fatty acids-vitamin e, lamotrigine, levothyroxine, triamcinolone acetonide, guaifenesin, and cholecalciferol (vitamin d3). .      She  has a past medical history of Dementia ([facility] Multiple sclerosis ([facility] and Osteoporosis. [place] Sleepiness Score: 20   and Modified F.O.S.Q. Score Total / 2: 16      O>    Visit Vitals  /66 (BP 1 Location: Left arm, BP Patient Position: Sitting)   Pulse 61   Ht 5' 4\" (1.626 m)   Wt 190 lb 14.4 oz (86.6 kg)   SpO2 95%   BMI 32.77 kg/m²         General:   Not in acute distress   Eyes:  Anicteric sclerae, no obvious strabismus   Nose:  No obvious nasal septum deviation    Oropharynx:   Class 4 oropharyngeal outlet, thick tongue base, uvula not seen due to low-lying soft palate, narrow tonsilo-pharyngeal pilars   Tonsils:   tonsils are not visualized due to low-lying soft palate   Neck:   midline trachea   Chest/Lungs:  Equal lung expansion, clear on auscultation    CVS:  Normal rate, regular rhythm; no JVD   Skin:  Warm to touch; no obvious rashes   Neuro:  No focal deficits ; no obvious tremor    Psych:  Normal affect,  normal countenance;           A>    ICD-10-CM ICD-9-CM    1. Mixed sleep apnea G47.39 327.29 SLEEP LAB (PAP TITRATION)   2. MS (multiple sclerosis) ([facility]) G35 340    3. Parkinson disease ([facility] G20 332.0    4. BMI 32.0-32.9,adult Z68.32 V85.32    5. H/O: depression Z86.59 V11.8      AHI = 15 (2013). On CPAP : 7 cmH2O. Compliant:      yes    Therapeutic Response:  Positive    P>    * She was advised to use the bed only for sleeping and to leave the bed when not able to return to sleep within 20 minutes. * She was advised to use her PAP (sleep needs of adults reviewed) each time she sleeps. * Patient is not using her PAP device towards the end of the night suggesting possible poor tolerance of PAP therapy. She agrees to return for a titration prior to getting a new device. * Inspire therapy as an alternate to PAP therapy particularly given her diagnosis of Dementia / MS and difficulties with PAP therapy discussed her and her spouse.     * They have agreed to discuss this with their daughter who has power of  and will contact us if they want to proceed with Inspire therapy. They are aware that the patient will need to stop her PAP device for a week and return for a diagnostic test rather than a titration to ensure that the patients sleep related breathing disorder is primarily an obstructive one. Orders Placed This Encounter    SLEEP LAB (PAP TITRATION)     Standing Status:   Future     Standing Expiration Date:   8/24/2020     Order Specific Question:   Reason for Exam     Answer:   HANK       * We have recommended a dedicated weight loss through appropriate diet and an exercise regiment as significant weight reduction has been shown to reduce severity of obstructive sleep apnea. *   Follow-up and Dispositions    · Return in about 1 year (around 2/24/2021), or if symptoms worsen or fail to improve. * She was asked to contact our office for any problems regarding PAP therapy. * Counseling was provided regarding the importance of regular PAP use and on proper sleep hygiene and safe driving. * Re-enforced proper and regular cleaning for the device. Thank you for allowing us to participate in your patient's medical care. Office visit exceeded 40 minutes with counseling and direction of care taking up more than 50% of the allotted time. Adrien Saavedra MD, FAASM  Electronically signed.  02/24/20

## 2020-02-24 NOTE — PATIENT INSTRUCTIONS
217 [facility] [place]. [place], [address]  Tel.  [phone]  Fax. 100 [facility] 60  [place], [address] [facility]  Tel.  [phone]  Fax. [phone] [address]  Tel.  [phone]  Fax. [phone]     Learning About CPAP for Sleep Apnea  What is CPAP? CPAP is a small machine that you use at home every night while you sleep. It increases air pressure in your throat to keep your airway open. When you have sleep apnea, this can help you sleep better so you feel much better. CPAP stands for \"continuous positive airway pressure. \"  The CPAP machine will have one of the following:  · A mask that covers your nose and mouth  · Prongs that fit into your nose  · A mask that covers your nose only, the most common type. This type is called NCPAP. The N stands for \"nasal.\"  Why is it done? CPAP is usually the best treatment for obstructive sleep apnea. It is the first treatment choice and the most widely used. Your doctor may suggest CPAP if you have:  · Moderate to severe sleep apnea. · Sleep apnea and coronary artery disease (CAD) or heart failure. How does it help? · CPAP can help you have more normal sleep, so you feel less sleepy and more alert during the daytime. · CPAP may help keep heart failure or other heart problems from getting worse. · NCPAP may help lower your blood pressure. · If you use CPAP, your bed partner may also sleep better because you are not snoring or restless. What are the side effects? Some people who use CPAP have:  · A dry or stuffy nose and a sore throat. · Irritated skin on the face. · Sore eyes. · Bloating. If you have any of these problems, work with your doctor to fix them. Here are some things you can try:  · Be sure the mask or nasal prongs fit well. · See if your doctor can adjust the pressure of your CPAP. · If your nose is dry, try a humidifier.   · If your nose is runny or stuffy, try decongestant medicine or a steroid nasal spray. If these things do not help, you might try a different type of machine. Some machines have air pressure that adjusts on its own. Others have air pressures that are different when you breathe in than when you breathe out. This may reduce discomfort caused by too much pressure in your nose. Where can you learn more? Go to Just Soles.be  Enter Bart Kocher in the search box to learn more about \"Learning About CPAP for Sleep Apnea. \"   © 6045-1201 Healthwise, Incorporated. Care instructions adapted under license by [employer] (which disclaims liability or warranty for this information). This care instruction is for use with your licensed healthcare professional. If you have questions about a medical condition or this instruction, always ask your healthcare professional. [address] any warranty or liability for your use of this information. Content Version: 4.1.90680; Last Revised: January 11, 2010  PROPER SLEEP HYGIENE    What to avoid  · Do not have drinks with caffeine, such as coffee or black tea, for 8 hours before bed. · Do not smoke or use other types of tobacco near bedtime. Nicotine is a stimulant and can keep you awake. · Avoid drinking alcohol late in the evening, because it can cause you to wake in the middle of the night. · Do not eat a big meal close to bedtime. If you are hungry, eat a light snack. · Do not drink a lot of water close to bedtime, because the need to urinate may wake you up during the night. · Do not read or watch TV in bed. Use the bed only for sleeping and sexual activity. What to try  · Go to bed at the same time every night, and wake up at the same time every morning. Do not take naps during the day. · Keep your bedroom quiet, dark, and cool. · Get regular exercise, but not within 3 to 4 hours of your bedtime. .  · Sleep on a comfortable pillow and mattress.   · If watching the clock makes you anxious, turn it facing away from you so you cannot see the time. · If you worry when you lie down, start a worry book. Well before bedtime, write down your worries, and then set the book and your concerns aside. · Try meditation or other relaxation techniques before you go to bed. · If you cannot fall asleep, get up and go to another room until you feel sleepy. Do something relaxing. Repeat your bedtime routine before you go to bed again. · Make your house quiet and calm about an hour before bedtime. Turn down the lights, turn off the TV, log off the computer, and turn down the volume on music. This can help you relax after a busy day. Drowsy Driving: The [employer] cites drowsiness as a causing factor in more than 679,465 police reported crashes annually, resulting in 76,000 injuries and 1,500 deaths. Other surveys suggest 55% of people polled have driven while drowsy in the past year, 23% had fallen asleep but not crashed, 3% crashed, and 2% had and accident due to drowsy driving. Who is at risk? Young Drivers: One study of drowsy driving accidents states that 55% of the drivers were under 25 years. Of those, 75% were male. Shift Workers and Travelers: People who work overnight or travel across time zones frequently are at higher risk of experiencing Circadian Rhythm Disorders. They are trying to work and function when their body is programed to sleep. Sleep Deprived: Lack of sleep has a serious impact on your ability to pay attention or focus on a task. Consistently getting less than the average of 8 hours your body needs creates partial or cumulative sleep deprivation. Untreated Sleep Disorders: Sleep Apnea, Narcolepsy, R.L.S., and other sleep disorders (untreated) prevent a person from getting enough restful sleep. This leads to excessive daytime sleepiness and increases the risk for drowsy driving accidents by up to 7 times.   Medications / Alcohol: Even over the counter medications can cause drowsiness. Medications that impair a drivers attention should have a warning label. Alcohol naturally makes you sleepy and on its own can cause accidents. Combined with excessive drowsiness its effects are amplified. Signs of Drowsy Driving:   * You don't remember driving the last few miles   * You may drift out of your timur   * You are unable to focus and your thoughts wander   * You may yawn more often than normal   * You have difficulty keeping your eyes open / nodding off   * Missing traffic signs, speeding, or tailgating  Prevention-   Good sleep hygiene, lifestyle and behavioral choices have the most impact on drowsy driving. There is no substitute for sleep and the average person requires 8 hours nightly. If you find yourself driving drowsy, stop and sleep. Consider the sleep hygiene tips provided during your visit as well. Medication Refill Policy: Refills for all medications require 1 week advance notice. Please have your pharmacy fax a refill request. We are unable to fax, or call in \"controled substance\" medications and you will need to pick these prescriptions up from our office. NuAx Activation    Thank you for requesting access to NuAx. Please follow the instructions below to securely access and download your online medical record. NuAx allows you to send messages to your doctor, view your test results, renew your prescriptions, schedule appointments, and more. How Do I Sign Up? 1. In your internet browser, go to [email or web address]. 2. Click on the First Time User? Click Here link in the Sign In box. You will see the New Member Sign Up page. 3. Enter your NuAx Access Code exactly as it appears below. You will not need to use this code after youve completed the sign-up process. If you do not sign up before the expiration date, you must request a new code.     NuAx Access Code: [email or web address]  Expires: 2020 12:21 PM (This is the date your Mgv access code will )    4. Enter the last four digits of your Social Security Number (xxxx) and Date of Birth (mm/dd/yyyy) as indicated and click Submit. You will be taken to the next sign-up page. 5. Create a Mgv ID. This will be your Mgv login ID and cannot be changed, so think of one that is secure and easy to remember. 6. Create a Mgv password. You can change your password at any time. 7. Enter your Password Reset Question and Answer. This can be used at a later time if you forget your password. 8. Enter your e-mail address. You will receive e-mail notification when new information is available in [address] 9. Click Sign Up. You can now view and download portions of your medical record. 10. Click the Download Summary menu link to download a portable copy of your medical information. Additional Information    If you have questions, please call [phone]. Remember, Mgv is NOT to be used for urgent needs. For medical emergencies, dial 911.

## 2020-02-27 ENCOUNTER — TELEPHONE (OUTPATIENT)
Dept: SLEEP MEDICINE | Age: 75
End: 2020-02-27

## 2020-02-27 NOTE — TELEPHONE ENCOUNTER
Dot Danielson (patients power of ) phoned the office wanting more information about  Inspire therapy that was discussed at patients office visit. Ms. Valente Lantigua would like a call regarding this matter. Ms. Valente Lantigua can be reached at [phone].

## 2020-04-07 ENCOUNTER — TELEPHONE (OUTPATIENT)
Dept: SLEEP MEDICINE | Age: 75
End: 2020-04-07

## 2020-05-18 NOTE — TELEPHONE ENCOUNTER
Spoke with Ms. Oralia Snyder and explained Inspire therapy and rationale for proposing this therapy. Steps to be undertaken discussed, these included:    1. Today's conversation. 2. Referral to Dr. Kel Baer if she agreed to pursue this therapy for her parent. 3. Sleep endoscopy by Dr. Mary Anne Chacko to determine if her upper airway was appropriate for this therapy. 4. Surgical placement of implant. She agrees to review this further at [facility] and will get back to us if their was interest in pursing this.

## 2020-06-23 ENCOUNTER — TELEPHONE (OUTPATIENT)
Dept: SLEEP MEDICINE | Age: 75
End: 2020-06-23

## 2020-06-23 DIAGNOSIS — G47.39 MIXED SLEEP APNEA: Primary | ICD-10-CM

## 2020-06-23 NOTE — TELEPHONE ENCOUNTER
Called and spoke to patient        Cancelled in-lab sleep study scheduled in July, due to COVID-19 Department Closure. Routing message to ordering provider for next steps.

## 2020-07-09 NOTE — TELEPHONE ENCOUNTER
Orders Placed This Encounter    NOVEL CORONAVIRUS (COVID-19)     Standing Status:   Future     Standing Expiration Date:   10/9/2020     Scheduling Instructions:      1) Due to current limited availability of the COVID-19 PCR test, tests will be prioritized and may not be completed.              2) Order only if the test result will change clinical management or necessary for a return to mission-critical employment decision.              3) Print and instruct patient to adhere to [facility] home isolation program. (Link Above)              4) Set up or refer patient for a monitoring program.              5) Have patient sign up for and leverage [employer] (if not previously done).      Order Specific Question:   Status     Answer:   Asymptomatic/Surveillance(e.g. pre-op/pre-procedure/pre-delivery/transfer)     Order Specific Question:   Reason for Test     Answer:   Upcoming elective surgery/procedure/delivery, return to work, or discharge to another facility    SLEEP LAB (PAP TITRATION)     Standing Status:   Future     Standing Expiration Date:   10/9/2020     Order Specific Question:   Reason for Exam     Answer:   Mixed Sleep Apnea

## 2020-08-10 ENCOUNTER — TELEPHONE (OUTPATIENT)
Dept: SLEEP MEDICINE | Age: 75
End: 2020-08-10

## 2020-08-10 NOTE — TELEPHONE ENCOUNTER
Called patient to reschedule titration sleep study that was canceled due to [address] [facility] closure.  answered stating she is currently on lock down in a nursing home and unable to attend at this time. He states he will call back once she is allowed to reschedule. He also asked for the [facility] phone number because they sent her the wrong supplies. Last on file was [facility] and I provided him with their telephone number.

## 2021-04-26 ENCOUNTER — OFFICE VISIT (OUTPATIENT)
Dept: NEUROLOGY | Age: 76
End: 2021-04-26
Payer: MEDICARE

## 2021-04-26 VITALS — RESPIRATION RATE: 20 BRPM | DIASTOLIC BLOOD PRESSURE: 72 MMHG | TEMPERATURE: 97.2 F | SYSTOLIC BLOOD PRESSURE: 126 MMHG

## 2021-04-26 DIAGNOSIS — G35 MS (MULTIPLE SCLEROSIS) (HCC): ICD-10-CM

## 2021-04-26 DIAGNOSIS — G40.909 NONINTRACTABLE EPILEPSY WITHOUT STATUS EPILEPTICUS, UNSPECIFIED EPILEPSY TYPE (HCC): ICD-10-CM

## 2021-04-26 DIAGNOSIS — E03.9 ACQUIRED HYPOTHYROIDISM: ICD-10-CM

## 2021-04-26 DIAGNOSIS — G21.4 VASCULAR PARKINSONISM (HCC): Primary | ICD-10-CM

## 2021-04-26 DIAGNOSIS — G25.0 ESSENTIAL TREMOR: ICD-10-CM

## 2021-04-26 DIAGNOSIS — R60.0 BILATERAL LEG EDEMA: ICD-10-CM

## 2021-04-26 DIAGNOSIS — F02.80 DEMENTIA ASSOCIATED WITH OTHER UNDERLYING DISEASE WITHOUT BEHAVIORAL DISTURBANCE (HCC): ICD-10-CM

## 2021-04-26 DIAGNOSIS — R20.2 PARESTHESIA OF BOTH FEET: ICD-10-CM

## 2021-04-26 PROCEDURE — 3017F COLORECTAL CA SCREEN DOC REV: CPT | Performed by: PSYCHIATRY & NEUROLOGY

## 2021-04-26 PROCEDURE — G8536 NO DOC ELDER MAL SCRN: HCPCS | Performed by: PSYCHIATRY & NEUROLOGY

## 2021-04-26 PROCEDURE — 1090F PRES/ABSN URINE INCON ASSESS: CPT | Performed by: PSYCHIATRY & NEUROLOGY

## 2021-04-26 PROCEDURE — G8399 PT W/DXA RESULTS DOCUMENT: HCPCS | Performed by: PSYCHIATRY & NEUROLOGY

## 2021-04-26 PROCEDURE — 99215 OFFICE O/P EST HI 40 MIN: CPT | Performed by: PSYCHIATRY & NEUROLOGY

## 2021-04-26 PROCEDURE — G8432 DEP SCR NOT DOC, RNG: HCPCS | Performed by: PSYCHIATRY & NEUROLOGY

## 2021-04-26 PROCEDURE — G8427 DOCREV CUR MEDS BY ELIG CLIN: HCPCS | Performed by: PSYCHIATRY & NEUROLOGY

## 2021-04-26 PROCEDURE — 1101F PT FALLS ASSESS-DOCD LE1/YR: CPT | Performed by: PSYCHIATRY & NEUROLOGY

## 2021-04-26 PROCEDURE — G8421 BMI NOT CALCULATED: HCPCS | Performed by: PSYCHIATRY & NEUROLOGY

## 2021-04-26 RX ORDER — LEVOTHYROXINE SODIUM 75 UG/1
75 TABLET ORAL
Qty: 30 TAB | Refills: 1 | Status: SHIPPED | OUTPATIENT
Start: 2021-04-26 | End: 2021-07-23 | Stop reason: SDUPTHER

## 2021-04-26 RX ORDER — BUMETANIDE 1 MG/1
1 TABLET ORAL DAILY
Qty: 30 TAB | Refills: 1 | Status: SHIPPED | OUTPATIENT
Start: 2021-04-26 | End: 2021-07-12

## 2021-04-26 RX ORDER — PROPRANOLOL HYDROCHLORIDE 10 MG/1
10 TABLET ORAL 2 TIMES DAILY
Qty: 180 TAB | Refills: 3 | Status: SHIPPED | OUTPATIENT
Start: 2021-04-26 | End: 2022-01-11 | Stop reason: SDUPTHER

## 2021-04-26 RX ORDER — LAMOTRIGINE 100 MG/1
50 TABLET ORAL DAILY
Qty: 45 TAB | Refills: 3 | Status: SHIPPED | OUTPATIENT
Start: 2021-04-26 | End: 2022-01-11 | Stop reason: SDUPTHER

## 2021-04-26 RX ORDER — MEMANTINE HYDROCHLORIDE AND DONEPEZIL HYDROCHLORIDE 28; 10 MG/1; MG/1
CAPSULE ORAL
Qty: 90 CAP | Refills: 3 | Status: SHIPPED | OUTPATIENT
Start: 2021-04-26 | End: 2022-01-11 | Stop reason: SDUPTHER

## 2021-04-26 RX ORDER — LEVOTHYROXINE SODIUM 75 UG/1
75 TABLET ORAL
COMMUNITY
End: 2021-04-26

## 2021-04-26 RX ORDER — CARBIDOPA AND LEVODOPA 25; 100 MG/1; MG/1
1 TABLET ORAL 3 TIMES DAILY
Qty: 270 TAB | Refills: 3 | Status: SHIPPED | OUTPATIENT
Start: 2021-04-26 | End: 2022-01-11 | Stop reason: SDUPTHER

## 2021-04-26 RX ORDER — BACLOFEN 20 MG/1
20 TABLET ORAL 3 TIMES DAILY
Qty: 270 TAB | Refills: 3 | Status: SHIPPED | OUTPATIENT
Start: 2021-04-26 | End: 2022-01-11 | Stop reason: SDUPTHER

## 2021-04-26 RX ORDER — GABAPENTIN 100 MG/1
200 CAPSULE ORAL 3 TIMES DAILY
Qty: 180 CAP | Refills: 5 | Status: SHIPPED | OUTPATIENT
Start: 2021-04-26 | End: 2021-11-30 | Stop reason: ALTCHOICE

## 2021-04-26 NOTE — PROGRESS NOTES
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

## 2021-04-26 NOTE — PROGRESS NOTES
NEUROLOGY CLINIC NOTE    Patient ID:  Lanney Peabody  [id number]  29 y.o.  1945    Date of Visit:  April 26, 2021    Reason for Visit: dementia, speech changes, gait issues    Chief Complaint   Patient presents with    Follow-up       History of Present Illness:     Patient Active Problem List    Diagnosis Date Noted    Mixed sleep apnea 01/02/2014     Past Medical History:   Diagnosis Date    Dementia ([address]     Multiple sclerosis ([facility] [address] [facility]     Osteoporosis       Past Surgical History:   Procedure Laterality Date    ABDOMEN SURGERY PROC UNLISTED      rectum removed    HX COLOSTOMY      HX GYN  11/13/2019    hysterectomy    HX ORTHOPAEDIC  2014    left knee replacement      Prior to Admission medications    Medication Sig Start Date End Date Taking? Authorizing Provider   levothyroxine (Euthyrox) 75 mcg tablet Take 75 mcg by mouth Daily (before breakfast). Yes Provider, Historical   NAMZARIC 28-10 mg CSpX  1/6/20  Yes Provider, Historical   carbidopa-levodopa (SINEMET)  mg per tablet Take 1 Tab by mouth three (3) times daily. 1/9/20  Yes Juan Holley., MD   bumetanide (BUMEX) 1 mg tablet Take  by mouth daily. Yes Provider, Historical   Calcium Carbonate-Vit D3-Min 600 mg calcium- 400 unit tab Take  by mouth. Yes Provider, Historical   gabapentin (NEURONTIN) 300 mg capsule Take 200 mg by mouth three (3) times daily. Yes Provider, Historical   propranolol (INDERAL) 10 mg tablet Take  by mouth three (3) times daily. Yes Provider, Historical   multivitamin with iron tablet Take 1 Tab by mouth daily. Yes Provider, Historical   baclofen (LIORESAL) 10 mg tablet Take 20 mg by mouth three (3) times daily. Yes Other, MD Nicholas   lamoTRIgine (LAMICTAL) 25 mg tablet Take 100 mg by mouth daily. Yes Other, MD Nicholas   cholecalciferol, vitamin D3, (VITAMIN D3) 2,000 unit Tab Take  by mouth. Yes Other, MD Nicholas   docusate sodium (COLACE) 100 mg capsule 100 mg.     Provider, Historical triamcinolone acetonide (KENALOG) 0.1 % ointment  1/7/20   Provider, Historical   guaiFENesin (ROBITUSSIN) 100 mg/5 mL liquid Take 200 mg by mouth three (3) times daily as needed for Cough. Provider, Historical   promethazine (PHENERGAN) 12.5 mg tablet Take  by mouth every six (6) hours as needed for Nausea. Provider, Historical   multivitamin, tx-iron-ca-min (THERA-M W/ IRON) 9 mg iron-400 mcg tab tablet Take 1 Tab by mouth daily. Provider, Historical   calcium-cholecalciferol, d3, 600-125 mg-unit tab Take  by mouth. Provider, Historical   aspirin delayed-release 81 mg tablet Take  by mouth daily. Provider, Historical   acetaminophen (TYLENOL) 325 mg tablet Take  by mouth every four (4) hours as needed for Pain. Provider, Historical   omega-3 fatty acids-vitamin e (FISH OIL) 1,000 mg cap Take 1 Cap by mouth. Other, MD Nicholas   levothyroxine (SYNTHROID) 75 mcg tablet Take 75 mcg by mouth Daily (before breakfast). Other, MD Nicholas     No Known Allergies   Social History     Tobacco Use    Smoking status: Never Smoker    Smokeless tobacco: Never Used   Substance Use Topics    Alcohol use: No     Alcohol/week: 0.0 standard drinks      Family History   Problem Relation Age of Onset    Hypertension Other     Heart Disease Other     Diabetes Other     Stroke Other     Depression Other         Subjective:      Lj Hernandez is a 77 y.o. LXTI with history of MS, epilepsy, dementia, parkinsonism and essential tremors who is here for follow up. Patient was previously seen at [facility] [place] [id number] and then at [facility] neurology. Seen initially 1/5/2010 at [facility]. 1. Multiple sclerosis/Dementia. Per patient condition started prior to the 1970's with seeing double and problems walking. She was seen in [place] and diagnosed with multiple sclerosis. She has not been on interferon or Copaxone treatment.  Records mention of IV steroid treatment for exacerbation and most recent (worsening gait and dementia) was at [facility] 11/2009. Family mentioned that the only medication she was on for what they thought was for MS was Depakote. Since moving to [place] she has been seen by Dr. Thomas Miller (neurologist) for the past 2 years or so and most recently by Dr. Boris Jeffries (neurologist) last 10/28/09. She was placed on Baclofen and Lamictal by Dr. Boris Jeffries. Brain MRI done 11/20/09 showed enlarged ventricles secondary to brain atrophy. Hyperintensity within the periventricular white matter with involvement of the corpus callosum. Extension of white matter signal abnormality into the temporal lobes as well as nandini and left middle cerebellar peduncle. No abnormal enhancement. Cervical MRI done 1/12/10 showed broad based right central disc protrusion C4-C5 causing moderate spinal canal stenosis and mild left foraminal stenosis. Thoracic MRI was unremarkable. Lumbar MRI showed moderate compression deformity L3 vertebral body with associated marrow edema. Mild spinal canal stenosis L2-L3. No cord abnormalities. She has seen neurosurgery and has been taking off the back brace as the fracture has healed well. Review of Brain MRI done in 2002, 2004 and 2009 showed no significant changes. Repeat Brain MRI with and without contrast done 4/11/12 did not reveal any new enhancing lesions. Same lesions when compared to 2009. Neuropsychiatry testing done 1/28/10 confirmed dementia secondary to MS. Patient was seen and evaluated by Dr. Sun Blackburn (geriatric psychiatrist) 5/29/2012 for competency evaluation. Patient was deemed not competent. Patient was admitted at [facility] after falling at home 10/14/2015. Head CT was negative. Pelvic x-ray was negative. Brain MRI with and without contrast 10/16/2015 revealed no abnormal enhancement. Large load of white matter disease. Diffuse atrophy and large ventricles. Lumbar MRI 10/14/2015 revealed L3 remote compression fracture.  Cervical MRI 10/14/2015 revealed C4-5 right paracentral disc protrusion with effacement of anterior subarachnoid space and slight flattening of the right anterior aspect of the spinal cord. Patient seen again at the [facility] ER 12/22/2015 for ground level fall and left hip pain. X-ray was negative for acute fracture. Patient has since move in to [id number] [facility], a assisted living facility. Since the last visit on 2/6/2020, patient and  reports that cognitively patient is stable. No dramatic changes. Still having issues with short term memory lapses. She continues to take Namzaric 28 mg/10 mg daily. Denies any side effects. Her neuropathic pain in her feet continues to be better. She is on Gabapentin 200 mg TID. Continues to use as needed Lidocaine 5% ointment. She also takes  Baclofen 20 mg 3x/day to help ease her leg stiffness. Speech continues to be better. 2. Parkinsonism/Tremors. Since the last visit on 2/06/2020, patient reports no changes to her issues with balance and walking. She using a rolling walker. No falls. She is taking Sinemet 25/100 mg TID. Denies any side effects. She notes significantly less postural and intentional RUE tremors. No falls. Patient is back home now and was discharged from the nursing facility. She is getting nurse visits at home and home undergoing PT and OT. She continues to take Propranolol 10 mg BID for the tremors as well. Denies any side effects. Tremors do not bother her usual activities of daily living. 3. Seizures. Records also mention of a possible complex partial seizure history. On Lamictal. Dr. Yuan Jones mentions a EEG done in his office that showed generalized slowing L>R with right frontal spike and wave discharges. EEG done 11/20/09 was read as normal. Last  EEG done showed generalized background slowing with bifrontal intermittent polyspike wave discharges representing seizure focus. Since the last visit on 2/6/2020, patient remains to be seizure free.  She continues to take Lamotrigine 50 mg daily. Denies any side effects. Outside reports reviewed: med list from nursing facility    Review of Systems:    A comprehensive review of systems was performed:   Constitutional: positive for none  Eyes: positive for none  Ears, nose, mouth, throat, and face: positive for none  Respiratory: positive for none  Cardiovascular: positive for leg swelling  Gastrointestinal: positive for none  Genitourinary: positive for none  Integument/breast: positive for none  Hematologic/lymphatic: positive for none  Musculoskeletal: positive for weakness  Neurological: positive for memory loss, tremor  Behavioral/Psych: positive for none  Endocrine: positive for none  Allergic/Immunologic: positive for none      Objective:     Visit Vitals  /72   Temp 97.2 °F (36.2 °C)   Resp 20     PHYSICAL EXAM:    NEUROLOGICAL EXAM:    Constitutional: Weight: obese. Ambulation: ambulates w/ walker. Head: Size/Trauma: normocephalic and atraumatic. Mental Status: Oriented but poor recent and remote memory. Fluent, no dysarthria or aphasia. Cranial Nerves: CN II - XII were intact except for decrease hearing and saccadic eye movements  Motor Exam: 5/5 in all extremity: less oncrease tone all 4 extremities with no rigidity UE. Reflexes: Reflexes Right: biceps 2/4, triceps 2/4, brachial radialis 2/4, patellar 2/4, and achilles 4/4. Reflexes Left: biceps 2/4, triceps 2/4, brachial radialis 2/4, patellar 2/4, and achilles 4/4. Plantar Reflex Right: response downgoing. Plantar Reflex Left: response downgoing. Coordination: intact FTN/ERIN; right arm/hand resting tremors and no postural/intentional tremors. Sensation: Sensation vibration decrease: on the distal extremities (glove and stocking). Gait: Better strides when walking. Not as stiff. Less bradykinesia. No turn en bloc. Better posture.  Truncal instability    Assessment:   Vascular parkinsonism  Multiple sclerosis  Dementia  Essential tremors  Epilepsy  Feet paresthesia  Hypothyroidism  Leg edema    Plan:   Exam reveals improvement with less increase tone all 4 extremity, less bradykinesia, better posture and better strides. Vascular parkinsonism instead of idiopathic PD. Responding to Sinemet. Continue Sinemet 25/100 mg TID. Prescription was provided. Significant fall risk. Continue use of walker and manual wheelchair. Continue PT and OT. Neurological examination reveals changes that can be seen in Multiple Sclerosis (saccadic eye movements, increase tone in the extremities, hyperreflexia and position sense deficits). Previous Brain MRI with and without contrast did not show any new demyelinating lesions. No indication to start interferon or Copaxone therapy or oral therapy. Continue Baclofen 20 mg TID for spasticity. Prescription was provided. Advised again to use of her equipment to prevent falls and injury. Continue close supervision. Continue 5% Lidocaine ointment for localized relief of her feet pain twice a day scheduled and Gabapentin 200 mg TID. Prescription was provided. They want to try and reduce the dosing. Lower down to 100 mg TID. Previous neuropsychological testing confirmed underlying dementia from her medical condition and brain lesions. Baseline dementia that causes significant limitation in learning new things and having the initiative to do things which apparently was better at the [address] and she seems to have been thriving. Patient will still need supervision with her medications and assure compliance. Continue Namzaric daily. Prescription was provided. Suspect elements of essential tremors involving the right arm/hand. Continue Propranolol 10 mg BID. Prescription was provided. Previous EEG confirms risk for seizures due to underlying epilepsy. Continue Lamictal 50 mg daily for seizure prophylaxis. Prescription was provided. Continue Gabapentin for feet paresthesia. Prescription was provided. Advised to establish care with PCP. Prescription for thyroid supplementation was provided until she can establish care. Diuretic prescription for leg edema was provided until she can establish care with PCP. All questions and concerns were answered. Visit lasted 50 minutes.   Greater than 50% was spent reviewing available medical records, discussion about her extensive condition, etiology, prognosis, discussion about establishing care with new PCP, patient is now at home and discussed safety and continued care, continue therapy, treatment options, medication, fall precautions

## 2021-05-28 ENCOUNTER — TELEPHONE (OUTPATIENT)
Dept: NEUROLOGY | Age: 76
End: 2021-05-28

## 2021-05-28 NOTE — TELEPHONE ENCOUNTER
Incoming call from patient's  requesting to speak with a nurse in reference to a medication that patient needs in which he did not disclose, however he did state that patient is out of medication.

## 2021-05-28 NOTE — TELEPHONE ENCOUNTER
Returned call. Notified gabapentin was sent to [facility] in [place] on 4/26/21 with 5 additional refills.

## 2021-07-12 DIAGNOSIS — R60.0 BILATERAL LEG EDEMA: ICD-10-CM

## 2021-07-12 RX ORDER — BUMETANIDE 1 MG/1
TABLET ORAL
Qty: 30 TABLET | Refills: 0 | Status: SHIPPED | OUTPATIENT
Start: 2021-07-12 | End: 2021-08-02

## 2021-07-13 ENCOUNTER — TRANSCRIBE ORDER (OUTPATIENT)
Dept: SCHEDULING | Age: 76
End: 2021-07-13

## 2021-07-19 ENCOUNTER — TRANSCRIBE ORDER (OUTPATIENT)
Dept: SCHEDULING | Age: 76
End: 2021-07-19

## 2021-07-19 DIAGNOSIS — M85.80 SENILE OSTEOPENIA: Primary | ICD-10-CM

## 2021-07-19 DIAGNOSIS — Z78.0 ASYMPTOMATIC MENOPAUSAL STATE: Primary | ICD-10-CM

## 2021-07-19 DIAGNOSIS — Z12.31 VISIT FOR SCREENING MAMMOGRAM: Primary | ICD-10-CM

## 2021-07-20 ENCOUNTER — HOSPITAL ENCOUNTER (OUTPATIENT)
Dept: MAMMOGRAPHY | Age: 76
Discharge: HOME | End: 2021-07-20
Attending: FAMILY MEDICINE
Payer: MEDICARE

## 2021-07-20 DIAGNOSIS — Z12.31 VISIT FOR SCREENING MAMMOGRAM: ICD-10-CM

## 2021-07-20 DIAGNOSIS — Z78.0 ASYMPTOMATIC MENOPAUSAL STATE: ICD-10-CM

## 2021-07-20 PROCEDURE — 77067 SCR MAMMO BI INCL CAD: CPT

## 2021-07-20 PROCEDURE — 77080 DXA BONE DENSITY AXIAL: CPT

## 2021-07-23 DIAGNOSIS — E03.9 ACQUIRED HYPOTHYROIDISM: ICD-10-CM

## 2021-07-26 RX ORDER — LEVOTHYROXINE SODIUM 75 UG/1
75 TABLET ORAL
Qty: 30 TABLET | Refills: 1 | Status: SHIPPED | OUTPATIENT
Start: 2021-07-26 | End: 2021-09-13

## 2021-07-27 ENCOUNTER — OFFICE VISIT (OUTPATIENT)
Dept: NEUROLOGY | Age: 76
End: 2021-07-27
Payer: MEDICARE

## 2021-07-27 VITALS
SYSTOLIC BLOOD PRESSURE: 112 MMHG | WEIGHT: 177 LBS | DIASTOLIC BLOOD PRESSURE: 64 MMHG | HEART RATE: 64 BPM | BODY MASS INDEX: 30.22 KG/M2 | OXYGEN SATURATION: 96 % | HEIGHT: 64 IN

## 2021-07-27 DIAGNOSIS — R60.0 BILATERAL LEG EDEMA: ICD-10-CM

## 2021-07-27 DIAGNOSIS — G25.0 ESSENTIAL TREMOR: ICD-10-CM

## 2021-07-27 DIAGNOSIS — G21.4 VASCULAR PARKINSONISM (HCC): Primary | ICD-10-CM

## 2021-07-27 DIAGNOSIS — F02.80 DEMENTIA ASSOCIATED WITH OTHER UNDERLYING DISEASE WITHOUT BEHAVIORAL DISTURBANCE (HCC): ICD-10-CM

## 2021-07-27 DIAGNOSIS — E03.9 ACQUIRED HYPOTHYROIDISM: ICD-10-CM

## 2021-07-27 DIAGNOSIS — G35 MS (MULTIPLE SCLEROSIS) (HCC): ICD-10-CM

## 2021-07-27 DIAGNOSIS — R20.2 PARESTHESIA OF BOTH FEET: ICD-10-CM

## 2021-07-27 PROCEDURE — G8399 PT W/DXA RESULTS DOCUMENT: HCPCS | Performed by: PSYCHIATRY & NEUROLOGY

## 2021-07-27 PROCEDURE — 1101F PT FALLS ASSESS-DOCD LE1/YR: CPT | Performed by: PSYCHIATRY & NEUROLOGY

## 2021-07-27 PROCEDURE — 3017F COLORECTAL CA SCREEN DOC REV: CPT | Performed by: PSYCHIATRY & NEUROLOGY

## 2021-07-27 PROCEDURE — G8427 DOCREV CUR MEDS BY ELIG CLIN: HCPCS | Performed by: PSYCHIATRY & NEUROLOGY

## 2021-07-27 PROCEDURE — G9899 SCRN MAM PERF RSLTS DOC: HCPCS | Performed by: PSYCHIATRY & NEUROLOGY

## 2021-07-27 PROCEDURE — 1090F PRES/ABSN URINE INCON ASSESS: CPT | Performed by: PSYCHIATRY & NEUROLOGY

## 2021-07-27 PROCEDURE — G8417 CALC BMI ABV UP PARAM F/U: HCPCS | Performed by: PSYCHIATRY & NEUROLOGY

## 2021-07-27 PROCEDURE — 99214 OFFICE O/P EST MOD 30 MIN: CPT | Performed by: PSYCHIATRY & NEUROLOGY

## 2021-07-27 PROCEDURE — G8536 NO DOC ELDER MAL SCRN: HCPCS | Performed by: PSYCHIATRY & NEUROLOGY

## 2021-07-27 PROCEDURE — G8510 SCR DEP NEG, NO PLAN REQD: HCPCS | Performed by: PSYCHIATRY & NEUROLOGY

## 2021-07-27 NOTE — PROGRESS NOTES
NEUROLOGY CLINIC NOTE    Patient ID:  Shy Frye  [id number]  60 y.o.  1945    Date of Visit:  July 27, 2021    Reason for Visit: dementia, speech changes, gait issues    Chief Complaint   Patient presents with    Follow-up    Memory Loss    Tremors       History of Present Illness:     Patient Active Problem List    Diagnosis Date Noted    Mixed sleep apnea 01/02/2014     Past Medical History:   Diagnosis Date    Dementia ([address]     [facility] [address]     Osteoporosis       Past Surgical History:   Procedure Laterality Date    HX COLOSTOMY      HX GYN  11/13/2019    hysterectomy    HX ORTHOPAEDIC  2014    left knee replacement    MS ABDOMEN SURGERY PROC UNLISTED      rectum removed      Prior to Admission medications    Medication Sig Start Date End Date Taking? Authorizing Provider   levothyroxine (Euthyrox) 75 mcg tablet Take 1 Tablet by mouth Daily (before breakfast). 7/26/21  Yes Serge St, MD   bumetanide Lovely [place]ili) 1 mg tablet Take 1 tablet by mouth once daily 7/12/21  Yes Serge St MD   propranoloL (INDERAL) 10 mg tablet Take 1 Tab by mouth two (2) times a day. 4/26/21  Yes Serge St MD   Namzaric 28-10 mg CSpX Take 1 cap daily 4/26/21  Yes Serge St MD   lamoTRIgine (LaMICtal) 100 mg tablet Take 0.5 Tabs by mouth daily. 4/26/21  Yes Serge St MD   carbidopa-levodopa (Sinemet)  mg per tablet Take 1 Tab by mouth three (3) times daily. 4/26/21  Yes Serge St MD   baclofen (LIORESAL) 20 mg tablet Take 1 Tab by mouth three (3) times daily. 4/26/21  Yes Serge St MD   gabapentin (NEURONTIN) 100 mg capsule Take 2 Caps by mouth three (3) times daily. 4/26/21  Yes Serge St MD   docusate sodium (COLACE) 100 mg capsule 100 mg.   Patient not taking: Reported on 7/27/2021    Provider, Historical   triamcinolone acetonide (KENALOG) 0.1 % ointment  1/7/20   Provider, Historical Calcium Carbonate-Vit D3-Min 600 mg calcium- 400 unit tab Take  by mouth. Patient not taking: Reported on 7/27/2021    Provider, Historical   guaiFENesin (ROBITUSSIN) 100 mg/5 mL liquid Take 200 mg by mouth three (3) times daily as needed for Cough. Patient not taking: Reported on 7/27/2021    Provider, Historical   promethazine (PHENERGAN) 12.5 mg tablet Take  by mouth every six (6) hours as needed for Nausea. Patient not taking: Reported on 7/27/2021    Provider, Historical   multivitamin, tx-iron-ca-min (THERA-M W/ IRON) 9 mg iron-400 mcg tab tablet Take 1 Tab by mouth daily. Patient not taking: Reported on 7/27/2021    Provider, Historical   calcium-cholecalciferol, d3, 600-125 mg-unit tab Take  by mouth. Patient not taking: Reported on 7/27/2021    Provider, Historical   aspirin delayed-release 81 mg tablet Take  by mouth daily. Patient not taking: Reported on 7/27/2021    Provider, Historical   multivitamin with iron tablet Take 1 Tab by mouth daily. Patient not taking: Reported on 7/27/2021    Provider, Historical   acetaminophen (TYLENOL) 325 mg tablet Take  by mouth every four (4) hours as needed for Pain. Patient not taking: Reported on 7/27/2021    Provider, Historical   omega-3 fatty acids-vitamin e (FISH OIL) 1,000 mg cap Take 1 Cap by mouth. Patient not taking: Reported on 7/27/2021    Other, MD Nicholas   cholecalciferol, vitamin D3, (VITAMIN D3) 2,000 unit Tab Take  by mouth. Patient not taking: Reported on 7/27/2021    Other, MD Nicholas     No Known Allergies   Social History     Tobacco Use    Smoking status: Never Smoker    Smokeless tobacco: Never Used   Substance Use Topics    Alcohol use: No     Alcohol/week: 0.0 standard drinks      Family History   Problem Relation Age of Onset    Hypertension Other     Heart Disease Other     Diabetes Other     Stroke Other     Depression Other         Subjective:      Anny Nails is a 77 y.o.   PNMS with history of MS, epilepsy, dementia, parkinsonism and essential tremors who is here for follow up. Patient was previously seen at [facility] [place] [id number] and then at [facility] neurology. Seen initially 1/5/2010 at [facility]. 1. Multiple sclerosis/Dementia. Per patient condition started prior to the 1970's with seeing double and problems walking. She was seen in [place] and diagnosed with multiple sclerosis. She has not been on interferon or Copaxone treatment. Records mention of IV steroid treatment for exacerbation and most recent (worsening gait and dementia) was at [facility] 11/2009. Family mentioned that the only medication she was on for what they thought was for MS was Depakote. Since moving to [place] she has been seen by Dr. Suresh Smith (neurologist) for the past 2 years or so and most recently by Dr. Chalino Arnold (neurologist) last 10/28/09. She was placed on Baclofen and Lamictal by Dr. Chalino Arnold. Brain MRI done 11/20/09 showed enlarged ventricles secondary to brain atrophy. Hyperintensity within the periventricular white matter with involvement of the corpus callosum. Extension of white matter signal abnormality into the temporal lobes as well as nandini and left middle cerebellar peduncle. No abnormal enhancement. Cervical MRI done 1/12/10 showed broad based right central disc protrusion C4-C5 causing moderate spinal canal stenosis and mild left foraminal stenosis. Thoracic MRI was unremarkable. Lumbar MRI showed moderate compression deformity L3 vertebral body with associated marrow edema. Mild spinal canal stenosis L2-L3. No cord abnormalities. She has seen neurosurgery and has been taking off the back brace as the fracture has healed well. Review of Brain MRI done in 2002, 2004 and 2009 showed no significant changes. Repeat Brain MRI with and without contrast done 4/11/12 did not reveal any new enhancing lesions. Same lesions when compared to 2009.     Neuropsychiatry testing done 1/28/10 confirmed dementia secondary to MS. Patient was seen and evaluated by Dr. Dinesh Whaley (geriatric psychiatrist) 5/29/2012 for competency evaluation. Patient was deemed not competent. Patient was admitted at [facility] after falling at home 10/14/2015. Head CT was negative. Pelvic x-ray was negative. Brain MRI with and without contrast 10/16/2015 revealed no abnormal enhancement. Large load of white matter disease. Diffuse atrophy and large ventricles. Lumbar MRI 10/14/2015 revealed L3 remote compression fracture. Cervical MRI 10/14/2015 revealed C4-5 right paracentral disc protrusion with effacement of anterior subarachnoid space and slight flattening of the right anterior aspect of the spinal cord. Patient seen again at the [facility] ER 12/22/2015 for ground level fall and left hip pain. X-ray was negative for acute fracture. Patient has since move in to [address] [place] [address], a assisted living facility. Since the last visit on 4/26/2021, patient and  reports that cognitively patient is stable. No dramatic changes. Still having issues with short term memory lapses. She continues to take Namzaric 28 mg/10 mg daily. Denies any side effects. Her neuropathic pain in her feet continues to be better.  has lowered her Gabapentin to 100 mg BID and plans to taper if off as patient has a tendency to be sleepy during the day. Continues to use as needed Lidocaine 5% ointment. She also takes  Baclofen 20 mg 3x/day to help ease her leg stiffness. Speech continues to be better. 2. Parkinsonism/Tremors. Since the last visit on 4/26/2021, patient reports no changes to her issues with balance and walking. She using a rolling walker. No falls. She is taking Sinemet 25/100 mg TID. Denies any side effects. She notes significantly less postural and intentional RUE tremors. Patient did finish home PT and OT last June 2021. She continues to take Propranolol 10 mg BID for the tremors. Denies any side effects.  Tremors do not bother her usual activities of daily living. 3. Seizures. Records also mention of a possible complex partial seizure history. On Lamictal. Dr. Aimee Brown mentions a EEG done in his office that showed generalized slowing L>R with right frontal spike and wave discharges. EEG done 11/20/09 was read as normal. Last  EEG done showed generalized background slowing with bifrontal intermittent polyspike wave discharges representing seizure focus. Since the last visit on 4/26/2021, patient remains to be seizure free. She continues to take Lamotrigine 50 mg daily. Denies any side effects. Patient mentions having a sleep study done and is to be seen August 6 to discuss the results. Recent bone scan done revealed osteoporosis. Outside reports reviewed: bone dexa scan    Review of Systems:    A comprehensive review of systems was performed:   Constitutional: positive for none  Eyes: positive for none  Ears, nose, mouth, throat, and face: positive for none  Respiratory: positive for none  Cardiovascular: positive for leg swelling  Gastrointestinal: positive for none  Genitourinary: positive for none  Integument/breast: positive for none  Hematologic/lymphatic: positive for none  Musculoskeletal: positive for weakness  Neurological: positive for memory loss, tremor  Behavioral/Psych: positive for none  Endocrine: positive for none  Allergic/Immunologic: positive for none      Objective:     Visit Vitals  /64 (BP 1 Location: Left upper arm, BP Patient Position: Sitting, BP Cuff Size: Adult)   Pulse 64   Ht 5' 4\" (1.626 m)   Wt 80.3 kg (177 lb)   SpO2 96%   BMI 30.38 kg/m²     PHYSICAL EXAM:    NEUROLOGICAL EXAM:    Constitutional: Weight: obese. Ambulation: ambulates w/ walker. Head: Size/Trauma: normocephalic and atraumatic. Mental Status: Oriented but poor recent and remote memory. Fluent, no dysarthria or aphasia.    Cranial Nerves: CN II - XII were intact except for decrease hearing and saccadic eye movements  Motor Exam: 5/5 in all extremity: less oncrease tone all 4 extremities with no rigidity UE. Reflexes: Reflexes Right: biceps 2/4, triceps 2/4, brachial radialis 2/4, patellar 2/4, and achilles 4/4. Reflexes Left: biceps 2/4, triceps 2/4, brachial radialis 2/4, patellar 2/4, and achilles 4/4. Plantar Reflex Right: response downgoing. Plantar Reflex Left: response downgoing. Coordination: intact FTN/ERIN; right arm/hand resting tremors and no postural/intentional tremors. Sensation: Sensation vibration decrease: on the distal extremities (glove and stocking). Gait: Better strides when walking. Not as stiff. Less bradykinesia. No turn en bloc. Better posture. Truncal instability    Assessment:   Vascular parkinsonism  Multiple sclerosis  Dementia  Essential tremors  Epilepsy  Feet paresthesia  Hypothyroidism  Leg edema    Plan:   Exam reveals improvement with less increase tone all 4 extremity, less bradykinesia, better posture and better strides. Vascular parkinsonism instead of idiopathic PD. Responding to Sinemet. Continue Sinemet 25/100 mg TID. Significant fall risk. Continue use of walker and manual wheelchair. Continue exercises taught by PT and OT. Neurological examination reveals changes that can be seen in Multiple Sclerosis (saccadic eye movements, increase tone in the extremities, hyperreflexia and position sense deficits). Previous Brain MRI with and without contrast did not show any new demyelinating lesions. No indication to start interferon or Copaxone therapy or oral therapy. Continue Baclofen 20 mg TID for spasticity. Prescription was provided. Advised again to use of her equipment to prevent falls and injury. Continue close supervision. Continue 5% Lidocaine ointment for localized relief of her feet pain twice a day scheduled. Agree with tapering off Gabapentin. Previous neuropsychological testing confirmed underlying dementia from her medical condition and brain lesions.  Baseline dementia that causes significant limitation in learning new things and having the initiative to do things which apparently was better at the [address] and she seems to have been thriving. Patient will still need supervision with her medications and assure compliance. Continue Namzaric daily. Suspect elements of essential tremors involving the right arm/hand. Continue Propranolol 10 mg BID. Previous EEG confirms risk for seizures due to underlying epilepsy. Continue Lamictal 50 mg daily for seizure prophylaxis. Continue localized cream. Taper off Gabapentin. Patient has a new PCP. Prescription for thyroid supplementation was recently provided until she can establish care. Diuretic prescription for leg edema was recently provided until she can establish care with PCP. All questions and concerns were answered.

## 2021-07-27 NOTE — LETTER
7/28/2021    Patient: Lara Galan   YOB: 1945   Date of Visit: 7/27/2021     Sanchez Grullon MD  [address]  Via Fax: [phone]    Dear Sanchez Grullon MD,      Thank you for referring Ms. Lilly Chacon to [facility] for evaluation. My notes for this consultation are attached. If you have questions, please do not hesitate to call me. I look forward to following your patient along with you.       Sincerely,    Eladio Scott MD

## 2021-07-29 ENCOUNTER — TRANSCRIBE ORDER (OUTPATIENT)
Dept: SCHEDULING | Age: 76
End: 2021-07-29

## 2021-07-29 DIAGNOSIS — R92.8 ABNORMAL MAMMOGRAM: Primary | ICD-10-CM

## 2021-08-02 DIAGNOSIS — R60.0 BILATERAL LEG EDEMA: ICD-10-CM

## 2021-08-02 RX ORDER — BUMETANIDE 1 MG/1
TABLET ORAL
Qty: 30 TABLET | Refills: 0 | Status: SHIPPED | OUTPATIENT
Start: 2021-08-02 | End: 2021-09-13

## 2021-08-03 ENCOUNTER — HOSPITAL ENCOUNTER (OUTPATIENT)
Dept: MAMMOGRAPHY | Age: 76
Discharge: HOME | End: 2021-08-03
Attending: FAMILY MEDICINE
Payer: MEDICARE

## 2021-08-03 DIAGNOSIS — R92.8 ABNORMAL MAMMOGRAM: ICD-10-CM

## 2021-08-03 PROCEDURE — 77061 BREAST TOMOSYNTHESIS UNI: CPT

## 2021-08-03 PROCEDURE — 76642 ULTRASOUND BREAST LIMITED: CPT

## 2021-09-13 DIAGNOSIS — E03.9 ACQUIRED HYPOTHYROIDISM: ICD-10-CM

## 2021-09-13 DIAGNOSIS — R60.0 BILATERAL LEG EDEMA: ICD-10-CM

## 2021-09-13 RX ORDER — LEVOTHYROXINE SODIUM 75 UG/1
TABLET ORAL
Qty: 30 TABLET | Refills: 0 | Status: SHIPPED | OUTPATIENT
Start: 2021-09-13 | End: 2021-10-29

## 2021-09-13 RX ORDER — BUMETANIDE 1 MG/1
TABLET ORAL
Qty: 30 TABLET | Refills: 0 | Status: SHIPPED | OUTPATIENT
Start: 2021-09-13 | End: 2021-10-07

## 2021-10-07 DIAGNOSIS — R60.0 BILATERAL LEG EDEMA: ICD-10-CM

## 2021-10-07 RX ORDER — BUMETANIDE 1 MG/1
TABLET ORAL
Qty: 30 TABLET | Refills: 0 | Status: SHIPPED | OUTPATIENT
Start: 2021-10-07 | End: 2021-11-04

## 2021-10-25 DIAGNOSIS — E03.9 ACQUIRED HYPOTHYROIDISM: ICD-10-CM

## 2021-10-29 RX ORDER — LEVOTHYROXINE SODIUM 75 UG/1
TABLET ORAL
Qty: 30 TABLET | Refills: 0 | Status: SHIPPED | OUTPATIENT
Start: 2021-10-29 | End: 2021-11-26

## 2021-11-03 ENCOUNTER — TELEPHONE (OUTPATIENT)
Dept: NEUROLOGY | Age: 76
End: 2021-11-03

## 2021-11-04 DIAGNOSIS — R60.0 BILATERAL LEG EDEMA: ICD-10-CM

## 2021-11-04 RX ORDER — BUMETANIDE 1 MG/1
TABLET ORAL
Qty: 30 TABLET | Refills: 0 | Status: SHIPPED | OUTPATIENT
Start: 2021-11-04

## 2021-11-23 DIAGNOSIS — E03.9 ACQUIRED HYPOTHYROIDISM: ICD-10-CM

## 2021-11-26 RX ORDER — LEVOTHYROXINE SODIUM 75 UG/1
TABLET ORAL
Qty: 30 TABLET | Refills: 0 | Status: SHIPPED | OUTPATIENT
Start: 2021-11-26

## 2021-11-30 ENCOUNTER — OFFICE VISIT (OUTPATIENT)
Dept: NEUROLOGY | Age: 76
End: 2021-11-30
Payer: MEDICARE

## 2021-11-30 VITALS
HEART RATE: 69 BPM | SYSTOLIC BLOOD PRESSURE: 146 MMHG | DIASTOLIC BLOOD PRESSURE: 74 MMHG | BODY MASS INDEX: 28.84 KG/M2 | TEMPERATURE: 97.6 F | WEIGHT: 168 LBS | OXYGEN SATURATION: 96 %

## 2021-11-30 DIAGNOSIS — G21.4 VASCULAR PARKINSONISM (HCC): Primary | ICD-10-CM

## 2021-11-30 DIAGNOSIS — F02.80 DEMENTIA ASSOCIATED WITH OTHER UNDERLYING DISEASE WITHOUT BEHAVIORAL DISTURBANCE (HCC): ICD-10-CM

## 2021-11-30 DIAGNOSIS — E03.9 ACQUIRED HYPOTHYROIDISM: ICD-10-CM

## 2021-11-30 DIAGNOSIS — G40.909 NONINTRACTABLE EPILEPSY WITHOUT STATUS EPILEPTICUS, UNSPECIFIED EPILEPSY TYPE (HCC): ICD-10-CM

## 2021-11-30 DIAGNOSIS — G47.33 OSA (OBSTRUCTIVE SLEEP APNEA): ICD-10-CM

## 2021-11-30 DIAGNOSIS — R20.2 PARESTHESIA OF BOTH FEET: ICD-10-CM

## 2021-11-30 DIAGNOSIS — R60.0 LEG EDEMA: ICD-10-CM

## 2021-11-30 DIAGNOSIS — G25.0 ESSENTIAL TREMOR: ICD-10-CM

## 2021-11-30 DIAGNOSIS — G35 MS (MULTIPLE SCLEROSIS) (HCC): ICD-10-CM

## 2021-11-30 PROCEDURE — G8399 PT W/DXA RESULTS DOCUMENT: HCPCS | Performed by: PSYCHIATRY & NEUROLOGY

## 2021-11-30 PROCEDURE — G8432 DEP SCR NOT DOC, RNG: HCPCS | Performed by: PSYCHIATRY & NEUROLOGY

## 2021-11-30 PROCEDURE — G8417 CALC BMI ABV UP PARAM F/U: HCPCS | Performed by: PSYCHIATRY & NEUROLOGY

## 2021-11-30 PROCEDURE — 1101F PT FALLS ASSESS-DOCD LE1/YR: CPT | Performed by: PSYCHIATRY & NEUROLOGY

## 2021-11-30 PROCEDURE — G8536 NO DOC ELDER MAL SCRN: HCPCS | Performed by: PSYCHIATRY & NEUROLOGY

## 2021-11-30 PROCEDURE — G8427 DOCREV CUR MEDS BY ELIG CLIN: HCPCS | Performed by: PSYCHIATRY & NEUROLOGY

## 2021-11-30 PROCEDURE — 99214 OFFICE O/P EST MOD 30 MIN: CPT | Performed by: PSYCHIATRY & NEUROLOGY

## 2021-11-30 PROCEDURE — 1090F PRES/ABSN URINE INCON ASSESS: CPT | Performed by: PSYCHIATRY & NEUROLOGY

## 2021-11-30 PROCEDURE — 3017F COLORECTAL CA SCREEN DOC REV: CPT | Performed by: PSYCHIATRY & NEUROLOGY

## 2021-11-30 NOTE — PROGRESS NOTES
NEUROLOGY CLINIC NOTE    Patient ID:  Bob Flirt  [id number]  15 y.o.  1945    Date of Visit:  November 30, 2021    Reason for Visit: facial pain, dementia, speech changes, gait issues    Chief Complaint   Patient presents with    Follow-up     Follow up MS, vascular parkinsons and dementia       History of Present Illness:     Patient Active Problem List    Diagnosis Date Noted    Mixed sleep apnea 01/02/2014     Past Medical History:   Diagnosis Date    [facility] [address] [facility]     Multiple sclerosis ([facility] [address] [facility]     Osteoporosis       Past Surgical History:   Procedure Laterality Date    HX COLOSTOMY      HX GYN  11/13/2019    hysterectomy    HX ORTHOPAEDIC  2014    left knee replacement    [place] ABDOMEN SURGERY PROC UNLISTED      rectum removed      Prior to Admission medications    Medication Sig Start Date End Date Taking? Authorizing Provider   Euthyrox 75 mcg tablet TAKE 1 TABLET BY MOUTH ONCE DAILY BEFORE BREAKFAST 11/26/21  Yes Souarv Neff MD   guido [facility]) 1 mg tablet Take 1 tablet by mouth once daily 11/4/21  Yes Sourav Neff MD   propranoloL (INDERAL) 10 mg tablet Take 1 Tab by mouth two (2) times a day. 4/26/21  Yes Sourav Neff MD   Namzaric 28-10 mg CSpX Take 1 cap daily 4/26/21  Yes Sourav Neff MD   lamoTRIgine (LaMICtal) 100 mg tablet Take 0.5 Tabs by mouth daily. 4/26/21  Yes Sourav Neff MD   carbidopa-levodopa (Sinemet)  mg per tablet Take 1 Tab by mouth three (3) times daily. 4/26/21  Yes Sourav Neff MD   baclofen (LIORESAL) 20 mg tablet Take 1 Tab by mouth three (3) times daily. 4/26/21  Yes Sourav Neff MD   gabapentin (NEURONTIN) 100 mg capsule Take 2 Caps by mouth three (3) times daily. 4/26/21  Yes Sourav Neff MD     No Known Allergies   Social History     Tobacco Use    Smoking status: Never Smoker    Smokeless tobacco: Never Used   Substance Use Topics    Alcohol use:  No Alcohol/week: 0.0 standard drinks      Family History   Problem Relation Age of Onset    Hypertension Other     Heart Disease Other     Diabetes Other     Stroke Other     Depression Other         Subjective:      Brenda Ray is a 77 y.o. YOJT with history of MS, epilepsy, dementia, parkinsonism and essential tremors who is here for follow up. Patient was previously seen at [facility] [place] [address] and then at [facility] neurology. Seen initially 1/5/2010 at [facility]. 1. Multiple sclerosis/Dementia. Per patient condition started prior to the 1970's with seeing double and problems walking. She was seen in [place] and diagnosed with multiple sclerosis. She has not been on interferon or Copaxone treatment. Records mention of IV steroid treatment for exacerbation and most recent (worsening gait and dementia) was at [facility] 11/2009. Family mentioned that the only medication she was on for what they thought was for MS was Depakote. Since moving to [place] she has been seen by Dr. Silvia Guan (neurologist) for the past 2 years or so and most recently by Dr. Charlene Ahmadi (neurologist) last 10/28/09. She was placed on Baclofen and Lamictal by Dr. Charlene Ahmadi. Brain MRI done 11/20/09 showed enlarged ventricles secondary to brain atrophy. Hyperintensity within the periventricular white matter with involvement of the corpus callosum. Extension of white matter signal abnormality into the temporal lobes as well as nandini and left middle cerebellar peduncle. No abnormal enhancement. Cervical MRI done 1/12/10 showed broad based right central disc protrusion C4-C5 causing moderate spinal canal stenosis and mild left foraminal stenosis. Thoracic MRI was unremarkable. Lumbar MRI showed moderate compression deformity L3 vertebral body with associated marrow edema. Mild spinal canal stenosis L2-L3. No cord abnormalities.  She has seen neurosurgery and has been taking off the back brace as the fracture has healed well. Review of Brain MRI done in 2002, 2004 and 2009 showed no significant changes. Repeat Brain MRI with and without contrast done 4/11/12 did not reveal any new enhancing lesions. Same lesions when compared to 2009. Neuropsychiatry testing done 1/28/10 confirmed dementia secondary to MS. Patient was seen and evaluated by Dr. Rebecca Trivedi (geriatric psychiatrist) 5/29/2012 for competency evaluation. Patient was deemed not competent. Patient was admitted at [facility] after falling at home 10/14/2015. Head CT was negative. Pelvic x-ray was negative. Brain MRI with and without contrast 10/16/2015 revealed no abnormal enhancement. Large load of white matter disease. Diffuse atrophy and large ventricles. Lumbar MRI 10/14/2015 revealed L3 remote compression fracture. Cervical MRI 10/14/2015 revealed C4-5 right paracentral disc protrusion with effacement of anterior subarachnoid space and slight flattening of the right anterior aspect of the spinal cord. Patient seen again at the [facility] ER 12/22/2015 for ground level fall and left hip pain. X-ray was negative for acute fracture. Patient has since move in to [address] [place] [address], a assisted living facility. Since the last visit on 7/27/2021, patient and  continues to report that cognitively patient is stable. No dramatic changes. Still having issues with short term memory lapses. They spent Thanksgiving holiday in [place] for 4 days. She continues to take Namzaric 28 mg/10 mg daily. Denies any side effects. Her neuropathic pain in her feet continues to be better. She is off Gabapentin. More awake during the day. Continues to use as needed Lidocaine 5% ointment. She also takes  Baclofen 20 mg 3x/day to help ease her leg stiffness. Speech continues to be better. Daughter messaged me about her concerns regarding the patient being left home alone for 2 days when her  goes out of town. 2. Parkinsonism/Tremors.       Since the last visit on 7/27/2021, patient reports no changes to her issues with balance and walking. She using a rolling walker. No falls. She is taking Sinemet 25/100 mg TID. Denies any side effects. She notes significantly less postural and intentional RUE tremors. She continues to take Propranolol 10 mg BID for the tremors. Denies any side effects. Tremors do not bother her usual activities of daily living. 3. Seizures. Records also mention of a possible complex partial seizure history. On Lamictal. Dr. Chandni Arreola mentions a EEG done in his office that showed generalized slowing L>R with right frontal spike and wave discharges. EEG done 11/20/09 was read as normal. Last  EEG done showed generalized background slowing with bifrontal intermittent polyspike wave discharges representing seizure focus. Since the last visit on 7/27/2021, patient remains to be seizure free. She continues to take Lamotrigine 50 mg daily. Denies any side effects. 4. HANK. Since the last visit, patient is on CPAP and reports compliance. She uses a nasal apparatus. She is currently complaining of facial pain more so on the left and has a contact rash on both sides.       Outside reports reviewed: none    Review of Systems:    A comprehensive review of systems was performed:   Constitutional: positive for none  Eyes: positive for none  Ears, nose, mouth, throat, and face: positive for none  Respiratory: positive for none  Cardiovascular: positive for leg swelling  Gastrointestinal: positive for none  Genitourinary: positive for none  Integument/breast: positive for none  Hematologic/lymphatic: positive for none  Musculoskeletal: positive for weakness  Neurological: positive for memory loss, tremor  Behavioral/Psych: positive for none  Endocrine: positive for none  Allergic/Immunologic: positive for none      Objective:     Visit Vitals  BP (!) 146/74 (BP 1 Location: Left arm, BP Patient Position: Sitting, BP Cuff Size: Adult)   Pulse 69   Temp 97.6 °F (36.4 °C) (Temporal)   Wt 76.2 kg (168 lb)   SpO2 96%   BMI 28.84 kg/m²     PHYSICAL EXAM:    NEUROLOGICAL EXAM:    Constitutional: Weight: obese. Ambulation: ambulates w/ walker. Head: Size/Trauma: normocephalic and atraumatic. Mental Status: Oriented but poor recent and remote memory. Fluent, no dysarthria or aphasia. Cranial Nerves: CN II - XII were intact except for decrease hearing and saccadic eye movements  Motor Exam: 5/5 in all extremity: less oncrease tone all 4 extremities with no rigidity UE. Reflexes: Reflexes Right: biceps 2/4, triceps 2/4, brachial radialis 2/4, patellar 2/4, and achilles 4/4. Reflexes Left: biceps 2/4, triceps 2/4, brachial radialis 2/4, patellar 2/4, and achilles 4/4. Plantar Reflex Right: response downgoing. Plantar Reflex Left: response downgoing. Coordination: intact FTN/ERIN; right arm/hand resting tremors and no postural/intentional tremors. Sensation: Sensation vibration decrease: on the distal extremities (glove and stocking). Gait: Better strides when walking. Not as stiff. Less bradykinesia. No turn en bloc. Better posture. Truncal instability    Assessment:   Vascular parkinsonism  Multiple sclerosis  Dementia  Essential tremors  Epilepsy  HANK  Feet paresthesia  Hypothyroidism  Leg edema    Plan:   Exam reveals improvement with less increase tone all 4 extremity, less bradykinesia, better posture and better strides. Vascular parkinsonism instead of idiopathic PD. Responding to Sinemet. Continue Sinemet 25/100 mg TID. Significant fall risk. Continue use of walker and manual wheelchair. Continue exercises taught by PT and OT. Neurological examination reveals changes that can be seen in Multiple Sclerosis (saccadic eye movements, increase tone in the extremities, hyperreflexia and position sense deficits). Previous Brain MRI with and without contrast did not show any new demyelinating lesions. No indication to start interferon or Copaxone therapy or oral therapy.  Continue Baclofen 20 mg TID for spasticity. Advised again to use of her equipment to prevent falls and injury. Continue close supervision. Continue 5% Lidocaine ointment for localized relief of her feet pain twice a day scheduled. Off of Gabapentin. Previous neuropsychological testing confirmed underlying dementia from her medical condition and brain lesions. Baseline dementia that causes significant limitation in learning new things and having the initiative to do things. Patient will still need supervision with her medications and assure compliance. Continue Namzaric daily. Advised to obtain cameras at home for safety when  is not home. Suspect elements of essential tremors involving the right arm/hand. Continue Propranolol 10 mg BID. Previous EEG confirms risk for seizures due to underlying epilepsy. Continue Lamictal 50 mg daily for seizure prophylaxis. Diagnosed with obstructive sleep apnea. Continue CPAP supplement. Advised to discuss adjustment of mask to reduce facial pain and also reduce use of facial mass during this pandemic to help reduce irritation. Continue localized cream. Off Gabapentin. Thyroid prescription was provided recently. Advised future prescription for thyroid supplementation should come from her PCP. Diuretic prescription for leg edema was recently provided and advised future refills should come from her PCP. All questions and concerns were answered.

## 2021-11-30 NOTE — LETTER
11/30/2021    Patient: Rory Mcbride   YOB: 1945   Date of Visit: 11/30/2021     Tamanna Baker MD  [address]  Via Fax: [phone]    Dear Tamanna Baker MD,      Thank you for referring Ms. Jailene Thompson to [facility] for evaluation. My notes for this consultation are attached. If you have questions, please do not hesitate to call me. I look forward to following your patient along with you.       Sincerely,    Troy Castro MD

## 2021-11-30 NOTE — PROGRESS NOTES
Chief Complaint   Patient presents with    Follow-up     Follow up MS, vascular parkinsons and dementia     Visit Vitals  BP (!) 146/74 (BP 1 Location: Left arm, BP Patient Position: Sitting, BP Cuff Size: Adult)   Pulse 69   Temp 97.6 °F (36.4 °C) (Temporal)   Wt 76.2 kg (168 lb)   SpO2 96%   BMI 28.84 kg/m²

## 2021-12-16 DIAGNOSIS — R60.0 BILATERAL LEG EDEMA: ICD-10-CM

## 2021-12-16 RX ORDER — BUMETANIDE 1 MG/1
1 TABLET ORAL DAILY
Qty: 30 TABLET | Refills: 1 | Status: CANCELLED | OUTPATIENT
Start: 2021-12-16

## 2022-01-11 DIAGNOSIS — G25.0 ESSENTIAL TREMOR: ICD-10-CM

## 2022-01-11 DIAGNOSIS — G40.909 NONINTRACTABLE EPILEPSY WITHOUT STATUS EPILEPTICUS, UNSPECIFIED EPILEPSY TYPE (HCC): ICD-10-CM

## 2022-01-11 DIAGNOSIS — F02.80 DEMENTIA ASSOCIATED WITH OTHER UNDERLYING DISEASE WITHOUT BEHAVIORAL DISTURBANCE (HCC): ICD-10-CM

## 2022-01-11 DIAGNOSIS — G35 MS (MULTIPLE SCLEROSIS) (HCC): ICD-10-CM

## 2022-01-11 DIAGNOSIS — G21.4 VASCULAR PARKINSONISM (HCC): ICD-10-CM

## 2022-01-11 RX ORDER — BACLOFEN 20 MG/1
20 TABLET ORAL 3 TIMES DAILY
Qty: 270 TABLET | Refills: 3 | Status: SHIPPED | OUTPATIENT
Start: 2022-01-11

## 2022-01-11 RX ORDER — MEMANTINE HYDROCHLORIDE AND DONEPEZIL HYDROCHLORIDE 28; 10 MG/1; MG/1
CAPSULE ORAL
Qty: 90 CAPSULE | Refills: 3 | Status: SHIPPED | OUTPATIENT
Start: 2022-01-11 | End: 2022-11-01

## 2022-01-11 RX ORDER — LAMOTRIGINE 100 MG/1
50 TABLET ORAL DAILY
Qty: 45 TABLET | Refills: 3 | Status: SHIPPED | OUTPATIENT
Start: 2022-01-11 | End: 2022-11-01

## 2022-01-11 RX ORDER — CARBIDOPA AND LEVODOPA 25; 100 MG/1; MG/1
1 TABLET ORAL 3 TIMES DAILY
Qty: 270 TABLET | Refills: 3 | Status: SHIPPED | OUTPATIENT
Start: 2022-01-11 | End: 2022-11-01

## 2022-01-11 RX ORDER — PROPRANOLOL HYDROCHLORIDE 10 MG/1
10 TABLET ORAL 2 TIMES DAILY
Qty: 180 TABLET | Refills: 3 | Status: SHIPPED | OUTPATIENT
Start: 2022-01-11 | End: 2022-11-01

## 2022-01-17 DIAGNOSIS — G21.4 VASCULAR PARKINSONISM (HCC): ICD-10-CM

## 2022-01-17 DIAGNOSIS — G35 MS (MULTIPLE SCLEROSIS) (HCC): Primary | ICD-10-CM

## 2022-05-24 ENCOUNTER — OFFICE VISIT (OUTPATIENT)
Dept: NEUROLOGY | Age: 77
End: 2022-05-24
Payer: MEDICARE

## 2022-05-24 VITALS — SYSTOLIC BLOOD PRESSURE: 130 MMHG | DIASTOLIC BLOOD PRESSURE: 68 MMHG | RESPIRATION RATE: 20 BRPM

## 2022-05-24 DIAGNOSIS — F02.80 DEMENTIA ASSOCIATED WITH OTHER UNDERLYING DISEASE WITHOUT BEHAVIORAL DISTURBANCE (HCC): ICD-10-CM

## 2022-05-24 DIAGNOSIS — G47.33 OSA (OBSTRUCTIVE SLEEP APNEA): ICD-10-CM

## 2022-05-24 DIAGNOSIS — R20.2 PARESTHESIA OF BOTH FEET: ICD-10-CM

## 2022-05-24 DIAGNOSIS — G21.4 VASCULAR PARKINSONISM (HCC): Primary | ICD-10-CM

## 2022-05-24 DIAGNOSIS — G40.909 NONINTRACTABLE EPILEPSY WITHOUT STATUS EPILEPTICUS, UNSPECIFIED EPILEPSY TYPE (HCC): ICD-10-CM

## 2022-05-24 DIAGNOSIS — G25.0 ESSENTIAL TREMOR: ICD-10-CM

## 2022-05-24 DIAGNOSIS — G35 MS (MULTIPLE SCLEROSIS) (HCC): ICD-10-CM

## 2022-05-24 PROCEDURE — 3017F COLORECTAL CA SCREEN DOC REV: CPT | Performed by: PSYCHIATRY & NEUROLOGY

## 2022-05-24 PROCEDURE — G8417 CALC BMI ABV UP PARAM F/U: HCPCS | Performed by: PSYCHIATRY & NEUROLOGY

## 2022-05-24 PROCEDURE — G8399 PT W/DXA RESULTS DOCUMENT: HCPCS | Performed by: PSYCHIATRY & NEUROLOGY

## 2022-05-24 PROCEDURE — 1090F PRES/ABSN URINE INCON ASSESS: CPT | Performed by: PSYCHIATRY & NEUROLOGY

## 2022-05-24 PROCEDURE — 99214 OFFICE O/P EST MOD 30 MIN: CPT | Performed by: PSYCHIATRY & NEUROLOGY

## 2022-05-24 PROCEDURE — G8427 DOCREV CUR MEDS BY ELIG CLIN: HCPCS | Performed by: PSYCHIATRY & NEUROLOGY

## 2022-05-24 PROCEDURE — 1101F PT FALLS ASSESS-DOCD LE1/YR: CPT | Performed by: PSYCHIATRY & NEUROLOGY

## 2022-05-24 PROCEDURE — G8432 DEP SCR NOT DOC, RNG: HCPCS | Performed by: PSYCHIATRY & NEUROLOGY

## 2022-05-24 PROCEDURE — G8536 NO DOC ELDER MAL SCRN: HCPCS | Performed by: PSYCHIATRY & NEUROLOGY

## 2022-05-24 NOTE — PROGRESS NOTES
NEUROLOGY CLINIC NOTE    Patient ID:  Antonia Pike  [id number]  13 y.o.  1945    Date of Visit:  May 24, 2022    Reason for Visit: facial pain, dementia, speech changes, gait issues    Chief Complaint   Patient presents with    Follow-up     vascular parkinsonism, dementia, MS        History of Present Illness:     Patient Active Problem List    Diagnosis Date Noted    Mixed sleep apnea 01/02/2014     Past Medical History:   Diagnosis Date    D[facility] ([facility] [address]     Multiple sclerosis ([facility] [address] [facility]     Osteoporosis       Past Surgical History:   Procedure Laterality Date    HX COLOSTOMY      HX GYN  11/13/2019    hysterectomy    HX ORTHOPAEDIC  2014    left knee replacement    [place] ABDOMEN SURGERY PROC UNLISTED      rectum removed      Prior to Admission medications    Medication Sig Start Date End Date Taking? Authorizing Provider   lamoTRIgine (LaMICtal) 100 mg tablet Take 0.5 Tablets by mouth daily. 1/11/22   Blanquita Sheehan MD   baclofen (LIORESAL) 20 mg tablet Take 1 Tablet by mouth three (3) times daily. 1/11/22   Blanquita Sheehan MD   carbidopa-levodopa (Sinemet)  mg per tablet Take 1 Tablet by mouth three (3) times daily. 1/11/22   Blanquita Sheehan MD   propranoloL (INDERAL) 10 mg tablet Take 1 Tablet by mouth two (2) times a day.  1/11/22   Blanquita Sheehan MD   Namzaric 28-10 mg CSpX Take 1 cap daily 1/11/22   Blanquita Sheehan MD   Euthyrox 75 mcg tablet TAKE 1 TABLET BY MOUTH ONCE DAILY BEFORE BREAKFAST 11/26/21   Blanquita Sheehan MD   bumetanide [facility]) 1 mg tablet Take 1 tablet by mouth once daily 11/4/21   Blanquita Sheehan MD     No Known Allergies   Social History     Tobacco Use    Smoking status: Never Smoker    Smokeless tobacco: Never Used   Substance Use Topics    Alcohol use: No     Alcohol/week: 0.0 standard drinks      Family History   Problem Relation Age of Onset    Hypertension Other     Heart Disease Other     Diabetes Other  Stroke Other     Depression Other         Subjective:      Antonia Pike is a 77 y.o. QZUF with history of MS, epilepsy, dementia, parkinsonism and essential tremors who is here for follow up. Patient was previously seen at [facility] [place] [address] and then at [facility] neurology. Seen initially 1/5/2010 at [facility]. 1. Multiple sclerosis/Dementia. Per patient condition started prior to the 1970's with seeing double and problems walking. She was seen in [place] and diagnosed with multiple sclerosis. She has not been on interferon or Copaxone treatment. Records mention of IV steroid treatment for exacerbation and most recent (worsening gait and dementia) was at [place] 11/2009. Family mentioned that the only medication she was on for what they thought was for MS was Depakote. Since moving to [place] she has been seen by Dr. Blaze Ny (neurologist) for the past 2 years or so and most recently by Dr. Gaby Dow (neurologist) last 10/28/09. She was placed on Baclofen and Lamictal by Dr. Gaby Dow. Brain MRI done 11/20/09 showed enlarged ventricles secondary to brain atrophy. Hyperintensity within the periventricular white matter with involvement of the corpus callosum. Extension of white matter signal abnormality into the temporal lobes as well as nandini and left middle cerebellar peduncle. No abnormal enhancement. Cervical MRI done 1/12/10 showed broad based right central disc protrusion C4-C5 causing moderate spinal canal stenosis and mild left foraminal stenosis. Thoracic MRI was unremarkable. Lumbar MRI showed moderate compression deformity L3 vertebral body with associated marrow edema. Mild spinal canal stenosis L2-L3. No cord abnormalities. She has seen neurosurgery and has been taking off the back brace as the fracture has healed well. Review of Brain MRI done in 2002, 2004 and 2009 showed no significant changes.  Repeat Brain MRI with and without contrast done 4/11/12 did not reveal any new enhancing lesions. Same lesions when compared to 2009. Neuropsychiatry testing done 1/28/10 confirmed dementia secondary to MS. Patient was seen and evaluated by Dr. Reena Gray (geriatric psychiatrist) 5/29/2012 for competency evaluation. Patient was deemed not competent. Patient was admitted at [facility] after falling at home 10/14/2015. Head CT was negative. Pelvic x-ray was negative. Brain MRI with and without contrast 10/16/2015 revealed no abnormal enhancement. Large load of white matter disease. Diffuse atrophy and large ventricles. Lumbar MRI 10/14/2015 revealed L3 remote compression fracture. Cervical MRI 10/14/2015 revealed C4-5 right paracentral disc protrusion with effacement of anterior subarachnoid space and slight flattening of the right anterior aspect of the spinal cord. Patient seen again at the [facility] ER 12/22/2015 for ground level fall and left hip pain. X-ray was negative for acute fracture. Patient has since move in to [id number] [place] [address], a assisted living facility. Since the last visit on 11/30/2021, patient did outpatient PT 3 times a week then 2 times a week and ended March 2022 with benefit. Able to walk better and stand for longer periods of time. However patient is not exercising on her own at home as diligent as before. Patient and  continues to report that cognitively patient is stable. No dramatic changes. Still having issues with short term memory lapses. She continues to take Namzaric 28 mg/10 mg daily. Denies any side effects. Her neuropathic pain in her feet continues to be better. Continues to use as needed Lidocaine 5% ointment. She also takes  Baclofen 20 mg 3x/day to help ease her leg stiffness. Speech continues to be better. 2. Parkinsonism/Tremors. Since the last visit on 11/30/2021, patient reports improvement of her issues with balance and walking. She using a rolling walker. No falls. She is taking Sinemet 25/100 mg TID. Denies any side effects.  She notes significantly less postural and intentional RUE tremors. She continues to take Propranolol 10 mg BID for the tremors. Denies any side effects. Tremors do not bother her usual activities of daily living. 3. Seizures. Records also mention of a possible complex partial seizure history. On Lamictal. Dr. Rema Ghotra mentions a EEG done in his office that showed generalized slowing L>R with right frontal spike and wave discharges. EEG done 11/20/09 was read as normal. Last  EEG done showed generalized background slowing with bifrontal intermittent polyspike wave discharges representing seizure focus. Since the last visit on 11/30/2021, patient remains to be seizure free. She continues to take Lamotrigine 50 mg daily. Denies any side effects. 4. HANK. Since the last visit on 11/30/2021, patient is on CPAP and reports compliance. She uses a nasal apparatus. She is currently complaining of facial pain more so on the left and has a contact rash on both sides. Outside reports reviewed: none    Review of Systems:    A comprehensive review of systems was performed:   Constitutional: positive for none  Eyes: positive for none  Ears, nose, mouth, throat, and face: positive for none  Respiratory: positive for none  Cardiovascular: positive for leg swelling  Gastrointestinal: positive for none  Genitourinary: positive for none  Integument/breast: positive for none  Hematologic/lymphatic: positive for none  Musculoskeletal: positive for weakness  Neurological: positive for memory loss, tremor  Behavioral/Psych: positive for none  Endocrine: positive for none  Allergic/Immunologic: positive for none      Objective:     Visit Vitals  /68 (BP 1 Location: Left arm, BP Patient Position: Sitting, BP Cuff Size: Adult)   Resp 20     PHYSICAL EXAM:    NEUROLOGICAL EXAM:    Constitutional: Weight: obese. Ambulation: ambulates w/ walker. Head: Size/Trauma: normocephalic and atraumatic.   Mental Status: Oriented but poor recent and remote memory. Fluent, no dysarthria or aphasia. Cranial Nerves: CN II - XII were intact except for decrease hearing and saccadic eye movements  Motor Exam: 5/5 in all extremity: less oncrease tone all 4 extremities with no rigidity UE. Reflexes: Reflexes Right: biceps 2/4, triceps 2/4, brachial radialis 2/4, patellar 2/4, and achilles 4/4. Reflexes Left: biceps 2/4, triceps 2/4, brachial radialis 2/4, patellar 2/4, and achilles 4/4. Plantar Reflex Right: response downgoing. Plantar Reflex Left: response downgoing. Coordination: intact FTN/ERIN; right arm/hand resting tremors and no postural/intentional tremors. Sensation: Sensation vibration decrease: on the distal extremities (glove and stocking). Gait: Better strides when walking. Not as stiff. Less bradykinesia. No turn en bloc. Better posture. Truncal instability    Assessment:   Vascular parkinsonism  Multiple sclerosis  Dementia  Essential tremors  Epilepsy  HANK  Feet paresthesia    Plan:   Exam reveals improvement with less increase tone all 4 extremity, less bradykinesia, better posture and better strides. Vascular parkinsonism instead of idiopathic PD. Responding to Sinemet. Continue Sinemet 25/100 mg TID. Significant fall risk. Continue use of walker and manual wheelchair. Continue exercises taught by PT and OT. Neurological examination reveals changes that can be seen in Multiple Sclerosis (saccadic eye movements, increase tone in the extremities, hyperreflexia and position sense deficits). Previous Brain MRI with and without contrast did not show any new demyelinating lesions. No indication to start interferon or Copaxone therapy or oral therapy. Continue Baclofen 20 mg TID for spasticity. Advised again to use of her equipment to prevent falls and injury. Continue close supervision. Continue 5% Lidocaine ointment for localized relief of her feet pain twice a day scheduled. Off of Gabapentin.     Previous neuropsychological testing confirmed underlying dementia from her medical condition and brain lesions. Baseline dementia that causes significant limitation in learning new things and having the initiative to do things. Patient will still need supervision with her medications and assure compliance. Continue Namzaric daily. Advised to obtain cameras at home for safety when  is not home. Suspect elements of essential tremors involving the right arm/hand. Continue Propranolol 10 mg BID. Previous EEG confirms risk for seizures due to underlying epilepsy. Continue Lamictal 50 mg daily for seizure prophylaxis. Diagnosed with obstructive sleep apnea. Continue CPAP supplement. Advised to discuss adjustment of mask to reduce facial pain and also reduce use of facial mass during this pandemic to help reduce irritation. Continue localized cream. Off Gabapentin. All questions and concerns were answered.

## 2022-05-24 NOTE — LETTER
5/24/2022    Patient: Bernie Kelly   YOB: 1945   Date of Visit: 5/24/2022     Denise Rodriguez MD  [address]  Via Fax: [phone]    Dear Denise Rodriguez MD,      Thank you for referring Ms. Sean Hamlin to [facility] for evaluation. My notes for this consultation are attached. If you have questions, please do not hesitate to call me. I look forward to following your patient along with you.       Sincerely,    Yarely Barnett MD

## 2022-10-31 DIAGNOSIS — G25.0 ESSENTIAL TREMOR: ICD-10-CM

## 2022-10-31 DIAGNOSIS — G21.4 VASCULAR PARKINSONISM (HCC): ICD-10-CM

## 2022-10-31 DIAGNOSIS — G40.909 NONINTRACTABLE EPILEPSY WITHOUT STATUS EPILEPTICUS, UNSPECIFIED EPILEPSY TYPE (HCC): ICD-10-CM

## 2022-10-31 DIAGNOSIS — F02.80 DEMENTIA ASSOCIATED WITH OTHER UNDERLYING DISEASE WITHOUT BEHAVIORAL DISTURBANCE (HCC): ICD-10-CM

## 2022-11-01 RX ORDER — MEMANTINE HYDROCHLORIDE AND DONEPEZIL HYDROCHLORIDE 28; 10 MG/1; MG/1
CAPSULE ORAL
Qty: 90 CAPSULE | Refills: 3 | Status: SHIPPED | OUTPATIENT
Start: 2022-11-01

## 2022-11-01 RX ORDER — PROPRANOLOL HYDROCHLORIDE 10 MG/1
TABLET ORAL
Qty: 180 TABLET | Refills: 3 | Status: SHIPPED | OUTPATIENT
Start: 2022-11-01

## 2022-11-01 RX ORDER — LAMOTRIGINE 100 MG/1
TABLET ORAL
Qty: 45 TABLET | Refills: 3 | Status: SHIPPED | OUTPATIENT
Start: 2022-11-01

## 2022-11-01 RX ORDER — CARBIDOPA AND LEVODOPA 25; 100 MG/1; MG/1
TABLET ORAL
Qty: 270 TABLET | Refills: 3 | Status: SHIPPED | OUTPATIENT
Start: 2022-11-01

## 2022-11-29 ENCOUNTER — OFFICE VISIT (OUTPATIENT)
Dept: NEUROLOGY | Age: 77
End: 2022-11-29
Payer: MEDICARE

## 2022-11-29 VITALS
HEIGHT: 64 IN | OXYGEN SATURATION: 95 % | HEART RATE: 75 BPM | SYSTOLIC BLOOD PRESSURE: 132 MMHG | RESPIRATION RATE: 18 BRPM | DIASTOLIC BLOOD PRESSURE: 82 MMHG | WEIGHT: 168 LBS | BODY MASS INDEX: 28.68 KG/M2

## 2022-11-29 DIAGNOSIS — G47.33 OSA (OBSTRUCTIVE SLEEP APNEA): ICD-10-CM

## 2022-11-29 DIAGNOSIS — G40.909 NONINTRACTABLE EPILEPSY WITHOUT STATUS EPILEPTICUS, UNSPECIFIED EPILEPSY TYPE (HCC): ICD-10-CM

## 2022-11-29 DIAGNOSIS — F02.80 DEMENTIA ASSOCIATED WITH OTHER UNDERLYING DISEASE WITHOUT BEHAVIORAL DISTURBANCE (HCC): ICD-10-CM

## 2022-11-29 DIAGNOSIS — G25.0 ESSENTIAL TREMOR: ICD-10-CM

## 2022-11-29 DIAGNOSIS — G35 MS (MULTIPLE SCLEROSIS) (HCC): ICD-10-CM

## 2022-11-29 DIAGNOSIS — G21.4 VASCULAR PARKINSONISM (HCC): Primary | ICD-10-CM

## 2022-11-29 DIAGNOSIS — R20.2 PARESTHESIA OF BOTH FEET: ICD-10-CM

## 2022-11-29 PROCEDURE — G8536 NO DOC ELDER MAL SCRN: HCPCS | Performed by: PSYCHIATRY & NEUROLOGY

## 2022-11-29 PROCEDURE — 1123F ACP DISCUSS/DSCN MKR DOCD: CPT | Performed by: PSYCHIATRY & NEUROLOGY

## 2022-11-29 PROCEDURE — G8399 PT W/DXA RESULTS DOCUMENT: HCPCS | Performed by: PSYCHIATRY & NEUROLOGY

## 2022-11-29 PROCEDURE — G8417 CALC BMI ABV UP PARAM F/U: HCPCS | Performed by: PSYCHIATRY & NEUROLOGY

## 2022-11-29 PROCEDURE — 99214 OFFICE O/P EST MOD 30 MIN: CPT | Performed by: PSYCHIATRY & NEUROLOGY

## 2022-11-29 PROCEDURE — 1090F PRES/ABSN URINE INCON ASSESS: CPT | Performed by: PSYCHIATRY & NEUROLOGY

## 2022-11-29 PROCEDURE — 1101F PT FALLS ASSESS-DOCD LE1/YR: CPT | Performed by: PSYCHIATRY & NEUROLOGY

## 2022-11-29 PROCEDURE — G8427 DOCREV CUR MEDS BY ELIG CLIN: HCPCS | Performed by: PSYCHIATRY & NEUROLOGY

## 2022-11-29 PROCEDURE — G8510 SCR DEP NEG, NO PLAN REQD: HCPCS | Performed by: PSYCHIATRY & NEUROLOGY

## 2022-11-29 RX ORDER — BACLOFEN 10 MG/1
10 TABLET ORAL 3 TIMES DAILY
Qty: 270 TABLET | Refills: 3 | Status: SHIPPED | OUTPATIENT
Start: 2022-11-29

## 2022-11-29 NOTE — PROGRESS NOTES
NEUROLOGY CLINIC NOTE    Patient ID:  Cindy Reyes  [id number]  5 y.o.  1945    Date of Visit:  November 29, 2022    Reason for Visit: facial pain, dementia, speech changes, gait issues    Chief Complaint   Patient presents with    Parkinsons Disease     6 month follow up- patient states the tremors in the right hand are about the same. Patient is accompanied by her        History of Present Illness:     Patient Active Problem List    Diagnosis Date Noted    Mixed sleep apnea 01/02/2014     Past Medical History:   Diagnosis Date    Dementia ([address]     Multiple sclerosis ([facility] [address]     Osteoporosis       Past Surgical History:   Procedure Laterality Date    HX COLOSTOMY      HX GYN  11/13/2019    hysterectomy    HX ORTHOPAEDIC  2014    left knee replacement    [place] ABDOMEN SURGERY PROC UNLISTED      rectum removed      Prior to Admission medications    Medication Sig Start Date End Date Taking? Authorizing Provider   lamoTRIgine (LaMICtal) 100 mg tablet TAKE ONE-HALF TABLET BY  MOUTH DAILY 11/1/22  Yes Bridget White MD   carbidopa-levodopa (SINEMET)  mg per tablet TAKE 1 TABLET BY MOUTH 3  TIMES DAILY 11/1/22  Yes Bridget White MD   Namzaric 28-10 mg CSpX TAKE 1 CAPSULE BY MOUTH  DAILY 11/1/22  Yes Bridget White MD   propranoloL (INDERAL) 10 mg tablet TAKE 1 TABLET BY MOUTH  TWICE DAILY 11/1/22  Yes Bridget White MD   baclofen (LIORESAL) 20 mg tablet Take 1 Tablet by mouth three (3) times daily.  1/11/22  Yes Bridget White MD   Euthyrox 75 mcg tablet TAKE 1 TABLET BY MOUTH ONCE DAILY BEFORE BREAKFAST 11/26/21  Yes Bridget White MD   bumetanide [place]) 1 mg tablet Take 1 tablet by mouth once daily 11/4/21  Yes Bridget White MD     No Known Allergies   Social History     Tobacco Use    Smoking status: Never    Smokeless tobacco: Never   Substance Use Topics    Alcohol use: No     Alcohol/week: 0.0 standard drinks      Family History Problem Relation Age of Onset    Hypertension Other     Heart Disease Other     Diabetes Other     Stroke Other     Depression Other         Subjective:      Olya Junior is a 69 y.o. LXLZ with history of MS, epilepsy, dementia, parkinsonism and essential tremors who is here for follow up. Patient was previously seen at [facility] [place] [address] and then at [address] [place] [address]. Seen initially 1/5/2010 at [facility]. 1. Multiple sclerosis/Dementia. Per patient condition started prior to the 1970's with seeing double and problems walking. She was seen in [place] and diagnosed with multiple sclerosis. She has not been on interferon or Copaxone treatment. Records mention of IV steroid treatment for exacerbation and most recent (worsening gait and dementia) was at [facility] 11/2009. Family mentioned that the only medication she was on for what they thought was for MS was Depakote. Since moving to [place] she has been seen by Dr. Paola Naidu (neurologist) for the past 2 years or so and most recently by Dr. Carol Castellanos (neurologist) last 10/28/09. She was placed on Baclofen and Lamictal by Dr. Carol Castellanos. Brain MRI done 11/20/09 showed enlarged ventricles secondary to brain atrophy. Hyperintensity within the periventricular white matter with involvement of the corpus callosum. Extension of white matter signal abnormality into the temporal lobes as well as nandini and left middle cerebellar peduncle. No abnormal enhancement. Cervical MRI done 1/12/10 showed broad based right central disc protrusion C4-C5 causing moderate spinal canal stenosis and mild left foraminal stenosis. Thoracic MRI was unremarkable. Lumbar MRI showed moderate compression deformity L3 vertebral body with associated marrow edema. Mild spinal canal stenosis L2-L3. No cord abnormalities. She has seen neurosurgery and has been taking off the back brace as the fracture has healed well.  Review of Brain MRI done in 2002, 2004 and 2009 showed no significant changes. Repeat Brain MRI with and without contrast done 4/11/12 did not reveal any new enhancing lesions. Same lesions when compared to 2009. Neuropsychiatry testing done 1/28/10 confirmed dementia secondary to MS. Patient was seen and evaluated by Dr. Ha Gross (geriatric psychiatrist) 5/29/2012 for competency evaluation. Patient was deemed not competent. Patient was admitted at [address] [place] [address] after falling at home 10/14/2015. Head CT was negative. Pelvic x-ray was negative. Brain MRI with and without contrast 10/16/2015 revealed no abnormal enhancement. Large load of white matter disease. Diffuse atrophy and large ventricles. Lumbar MRI 10/14/2015 revealed L3 remote compression fracture. Cervical MRI 10/14/2015 revealed C4-5 right paracentral disc protrusion with effacement of anterior subarachnoid space and slight flattening of the right anterior aspect of the spinal cord. Patient seen again at the [address] [place] [address] ER 12/22/2015 for ground level fall and left hip pain. X-ray was negative for acute fracture. Since the last visit on 5/24/2022, patient apparently well in [place] last August 28, 2022 fell after missing several steps. Upon going home to [place], she decided to go to the [facility].  Patient was admitted and stayed there for a week. Found to have fracture of her sternum. Patient was then discharged to skilled nursing facility and stayed there for 4 to 5 weeks. Patient is finishing course of home PT and OT. She is also getting visits from an aide 4 times a week. Per , patient is not exercising on her own at home as diligent as before. Patient and  continues to report that cognitively patient is stable. No dramatic changes. Still having issues with short term memory lapses. She continues to take Namzaric 28 mg/10 mg daily. Denies any side effects. Her neuropathic pain in her feet is still the same.  She also takes  Baclofen 20 mg 3x/day to help ease her leg stiffness. Patient has been having episodes of drooling and slurred speech. Patient is also having issues with recurrent UTI. 2. Parkinsonism/Tremors. Since the last visit on 5/22/2022, patient reports sustained improvement of her issues with balance and walking. She using a rolling walker. She is taking Sinemet 25/100 mg TID. Denies any side effects. She notes significantly less postural and intentional RUE tremors. She continues to take Propranolol 10 mg BID for the tremors. Denies any side effects. Tremors do not bother her usual activities of daily living. 3. Seizures. Records also mention of a possible complex partial seizure history. On Lamictal. Dr. Negrita Argueta mentions a EEG done in his office that showed generalized slowing L>R with right frontal spike and wave discharges. EEG done 11/20/09 was read as normal. Last  EEG done showed generalized background slowing with bifrontal intermittent polyspike wave discharges representing seizure focus. Since the last visit on 5/22/2022, patient remains to be seizure free. She continues to take Lamotrigine 50 mg daily. Denies any side effects. 4. HANK. Since the last visit on 5/22/2022, patient is on CPAP and reports compliance. She uses a nasal apparatus. She is currently complaining of facial pain more so on the left and has a contact rash on both sides.       Outside reports reviewed: none    Review of Systems:    A comprehensive review of systems was performed:   Constitutional: positive for none  Eyes: positive for none  Ears, nose, mouth, throat, and face: positive for none  Respiratory: positive for none  Cardiovascular: positive for leg swelling  Gastrointestinal: positive for none  Genitourinary: positive for none  Integument/breast: positive for none  Hematologic/lymphatic: positive for none  Musculoskeletal: positive for weakness  Neurological: positive for memory loss, tremor  Behavioral/Psych: positive for none  Endocrine: positive for none  Allergic/Immunologic: positive for none      Objective:     Visit Vitals  /82   Pulse 75   Resp 18   Ht 5' 4\" (1.626 m)   Wt 76.2 kg (168 lb)   SpO2 95%   BMI 28.84 kg/m²     PHYSICAL EXAM:    NEUROLOGICAL EXAM:    Constitutional: Weight: obese. Ambulation: ambulates w/ walker. Head: Size/Trauma: normocephalic and atraumatic. Mental Status: Oriented but poor recent and remote memory. Fluent, with ataxic dysarthria. No aphasia. Cranial Nerves: CN II - XII were intact except for decrease hearing and saccadic eye movements  Motor Exam: 5/5 in all extremity: less oncrease tone all 4 extremities with no rigidity UE. Reflexes: Reflexes Right: biceps 2/4, triceps 2/4, brachial radialis 2/4, patellar 2/4, and achilles 4/4. Reflexes Left: biceps 2/4, triceps 2/4, brachial radialis 2/4, patellar 2/4, and achilles 4/4. Plantar Reflex Right: response downgoing. Plantar Reflex Left: response downgoing. Coordination: intact FTN/ERIN; right arm/hand resting tremors and no postural/intentional tremors. Sensation: Sensation vibration decrease: on the distal extremities (glove and stocking). Gait: Better strides when walking. Not as stiff. Less bradykinesia. No turn en bloc. Better posture. Truncal instability    Assessment:   Vascular parkinsonism  Multiple sclerosis  Dementia  Essential tremors  Epilepsy  HANK  Feet paresthesia    Plan:   Exam reveals improvement with less increase tone all 4 extremity, less bradykinesia, better posture and better strides. Vascular parkinsonism instead of idiopathic PD. Responding to Sinemet. Continue Sinemet 25/100 mg TID. Significant fall risk. Continue use of walker and manual wheelchair. Continue exercises taught by PT and OT. Neurological examination reveals changes that can be seen in Multiple Sclerosis (saccadic eye movements, increase tone in the extremities, hyperreflexia and position sense deficits).  Previous Brain MRI with and without contrast did not show any new demyelinating lesions. No indication to start interferon or Copaxone therapy or oral therapy. Reduce dose of baclofen to 10 mg 3 times daily given issues with intermittent slurred speech and drooling. See if this improves that issue. Possibly related to too much centrally acting medication. Prescription was provided. Advised again to use of her equipment to prevent falls and injury. Continue close supervision. Previous neuropsychological testing confirmed underlying dementia from her medical condition and brain lesions. Baseline dementia that causes significant limitation in learning new things and having the initiative to do things. Patient will still need supervision with her medications and assure compliance. Continue Namzaric daily. Advised to obtain cameras at home for safety when  is not home. Suspect elements of essential tremors involving the right arm/hand. Continue Propranolol 10 mg BID. Previous EEG confirms risk for seizures due to underlying epilepsy. Continue Lamictal 50 mg daily for seizure prophylaxis. Diagnosed with obstructive sleep apnea. Continue CPAP supplement. Advised to discuss adjustment of mask to reduce facial pain and also reduce use of facial mass during this pandemic to help reduce irritation. Advised to start using capsaicin or Aspercreme for localized relief. Off Gabapentin. All questions and concerns were answered. This note was created using voice recognition software. Despite editing, there may be syntax errors.

## 2022-11-29 NOTE — LETTER
11/29/2022    Patient: Xuan Horta   YOB: 1945   Date of Visit: 11/29/2022     Evelyn Chiu MD  [phone]  Via Fax: [phone]    Dear Evelyn Chiu MD,      Thank you for referring Ms. Trace Damico to [facility] for evaluation. My notes for this consultation are attached. If you have questions, please do not hesitate to call me. I look forward to following your patient along with you.       Sincerely,    Bhanu Card MD

## 2022-11-29 NOTE — PROGRESS NOTES
Chief Complaint   Patient presents with    Parkinsons Disease     6 month follow up- patient states the tremors in the right hand are about the same.  Patient is accompanied by her        Visit Vitals  /82   Pulse 75   Resp 18   Ht 5' 4\" (1.626 m)   Wt 76.2 kg (168 lb)   SpO2 95%   BMI 28.84 kg/m²

## 2022-12-27 NOTE — PROGRESS NOTES
[facility]    Brief Operative Note    Pre-operative diagnosis: Left carotid artery stenosis [I65.22]  Post-operative diagnosis Same as pre-operative diagnosis    Procedure: Procedure(s):  LEFT CAROTID ENDARTERECTOMY WITH BOVINE PERICARDIUM PATCH ANGIOPLASTY, AND INTRAOPERATIVE ELECTROENCEPHALOGRAM MONITORING  Surgeon: Surgeon(s) and Role:     * Montez Aponte MD - Primary     * Zuri Bales MD - Assisting  Anesthesia: General   Estimated Blood Loss: 75 mL from 12/27/2022 11:11 AM to 12/27/2022  3:49 PM      Drains: None  Specimens:   ID Type Source Tests Collected by Time Destination   A : LEFT CAROTID PLAQUE Tissue Carotid Plaque, Left OR DOCUMENTATION ONLY Montez Aponte MD 12/27/2022  2:49 PM      Findings:   Partially occlusive left carotid plaque. Difficult dissection due to high bifurcation, proximity of hypoglossal nerve. Neuros unchanged at completion - tongue mindline, smile symmetric. .  Complications: None.  Implants:   Implant Name Type Inv. Item Serial No.  Lot No. LRB No. Used Action   IMP PATCH PERICARDIUM PHOTOFIX BOVINE 0.8X8CM [id number] - [id number] Bone/Tissue/Biologic IMP PATCH PERICARDIUM PHOTOFIX BOVINE 0.8X8CM [id number]  [facility] [id number] Left 1 Implanted            NEUROLOGY CLINIC NOTE    Patient ID:  Liliam Rosa  [id number]  80 y.o.  1945    Date of Visit:  February 6, 2020    Reason for Visit: dementia, speech changes, gait issues    Chief Complaint   Patient presents with    Follow-up     parkinsons, tremors, epilepsy        History of Present Illness:     Patient Active Problem List    Diagnosis Date Noted    Mixed sleep apnea 01/02/2014     Past Medical History:   Diagnosis Date    Dementia ([address]     Multiple sclerosis ([facility] [address] [facility]     Osteoporosis       Past Surgical History:   Procedure Laterality Date    ABDOMEN SURGERY PROC UNLISTED      rectum removed    HX COLOSTOMY      HX GYN  11/13/2019    hysterectomy    HX ORTHOPAEDIC  2014    left knee replacement      Prior to Admission medications    Medication Sig Start Date End Date Taking? Authorizing Provider   docusate sodium (COLACE) 100 mg capsule 100 mg. Yes Provider, Historical   NAMZARIC 28-10 mg CSpX  1/6/20  Yes Provider, Historical   triamcinolone acetonide (KENALOG) 0.1 % ointment  1/7/20  Yes Provider, Historical   carbidopa-levodopa (SINEMET)  mg per tablet Take 1 Tab by mouth three (3) times daily. 1/9/20  Yes Demario Mae MD   bumetanide (BUMEX) 1 mg tablet Take  by mouth daily. Yes Provider, Historical   Calcium Carbonate-Vit D3-Min 600 mg calcium- 400 unit tab Take  by mouth. Yes Provider, Historical   gabapentin (NEURONTIN) 300 mg capsule Take 300 mg by mouth three (3) times daily. Yes Provider, Historical   promethazine (PHENERGAN) 12.5 mg tablet Take  by mouth every six (6) hours as needed for Nausea. Yes Provider, Historical   propranolol (INDERAL) 10 mg tablet Take  by mouth three (3) times daily. Yes Provider, Historical   aspirin delayed-release 81 mg tablet Take  by mouth daily. Yes Provider, Historical   multivitamin with iron tablet Take 1 Tab by mouth daily.    Yes Provider, Historical   acetaminophen (TYLENOL) 325 mg tablet Take  by mouth every four (4) hours as needed for Pain. Yes Provider, Historical   baclofen (LIORESAL) 10 mg tablet Take 20 mg by mouth three (3) times daily. Yes Geovanna, MD Nicholas   omega-3 fatty acids-vitamin e (FISH OIL) 1,000 mg cap Take 1 Cap by mouth. Yes Geovanna, MD Nicholas   lamoTRIgine (LAMICTAL) 25 mg tablet Take 100 mg by mouth daily. Yes Geovanna, MD Nicholas   levothyroxine (SYNTHROID) 75 mcg tablet Take 75 mcg by mouth Daily (before breakfast). Yes Geovanna, MD Nicholas   guaiFENesin (ROBITUSSIN) 100 mg/5 mL liquid Take 200 mg by mouth three (3) times daily as needed for Cough. Provider, Historical   acetaminophen (MAPAP) 325 mg tablet Take  by mouth every four (4) hours as needed for Pain. Provider, Historical   multivitamin, tx-iron-ca-min (THERA-M W/ IRON) 9 mg iron-400 mcg tab tablet Take 1 Tab by mouth daily. Provider, Historical   calcium-cholecalciferol, d3, 600-125 mg-unit tab Take  by mouth. Provider, Historical   memantine (NAMENDA) 10 mg tablet Take  by mouth daily. Provider, Historical   HYDROcodone-acetaminophen (NORCO) 5-325 mg per tablet Take  by mouth. Provider, Historical   sertraline (ZOLOFT) 50 mg tablet Take  by mouth daily. Other, MD Nicholas   cholecalciferol, vitamin D3, (VITAMIN D3) 2,000 unit Tab Take  by mouth. Other, MD Nicholas   donepezil (ARICEPT) 10 mg tablet Take 10 mg by mouth nightly. Geovanna, MD Nicholas   pravastatin (PRAVACHOL) 40 mg tablet Take 40 mg by mouth nightly. Other, MD Nicholas     No Known Allergies   Social History     Tobacco Use    Smoking status: Never Smoker    Smokeless tobacco: Never Used   Substance Use Topics    Alcohol use: No     Alcohol/week: 0.0 standard drinks      Family History   Problem Relation Age of Onset    Hypertension Other     Heart Disease Other     Diabetes Other     Stroke Other     Depression Other         Subjective:      Calvin Huerta is a 69 y.o.   ASLV with history of MS, epilepsy, dementia, parkinsonism and essential tremors who is here for follow up. Patient was previously seen at [facility] [place] [id number] and then at [facility] neurology. Seen initially 1/5/2010 at [facility]. 1. Multiple sclerosis/Dementia. Per patient condition started prior to the 1970's with seeing double and problems walking. She was seen in [place] and diagnosed with multiple sclerosis. She has not been on interferon or Copaxone treatment. Records mention of IV steroid treatment for exacerbation and most recent (worsening gait and dementia) was at [address] 11/2009. Family mentioned that the only medication she was on for what they thought was for MS was Depakote. Since moving to [place] she has been seen by Dr. Kae Johnson (neurologist) for the past 2 years or so and most recently by Dr. Mani Alcaraz (neurologist) last 10/28/09. She was placed on Baclofen and Lamictal by Dr. Mani Alcaraz. Brain MRI done 11/20/09 showed enlarged ventricles secondary to brain atrophy. Hyperintensity within the periventricular white matter with involvement of the corpus callosum. Extension of white matter signal abnormality into the temporal lobes as well as nandini and left middle cerebellar peduncle. No abnormal enhancement. Cervical MRI done 1/12/10 showed broad based right central disc protrusion C4-C5 causing moderate spinal canal stenosis and mild left foraminal stenosis. Thoracic MRI was unremarkable. Lumbar MRI showed moderate compression deformity L3 vertebral body with associated marrow edema. Mild spinal canal stenosis L2-L3. No cord abnormalities. She has seen neurosurgery and has been taking off the back brace as the fracture has healed well. Review of Brain MRI done in 2002, 2004 and 2009 showed no significant changes. Repeat Brain MRI with and without contrast done 4/11/12 did not reveal any new enhancing lesions. Same lesions when compared to 2009. Neuropsychiatry testing done 1/28/10 confirmed dementia secondary to MS.  Patient was seen and evaluated by  Maureen Benitez (geriatric psychiatrist) 5/29/2012 for competency evaluation. Patient was deemed not competent. Patient was admitted at [facility] after falling at home 10/14/2015. Head CT was negative. Pelvic x-ray was negative. Brain MRI with and without contrast 10/16/2015 revealed no abnormal enhancement. Large load of white matter disease. Diffuse atrophy and large ventricles. Lumbar MRI 10/14/2015 revealed L3 remote compression fracture. Cervical MRI 10/14/2015 revealed C4-5 right paracentral disc protrusion with effacement of anterior subarachnoid space and slight flattening of the right anterior aspect of the spinal cord. Patient seen again at the [facility] ER 12/22/2015 for ground level fall and left hip pain. X-ray was negative for acute fracture. Patient has since move in to [facility], a assisted living facility. Since the last visit on 1/9/2020, patient and family reports that cognitively patient is stable. No dramatic changes. Still having issues with short term memory lapses. Still continues to be argumentative at times. She continues to take Namzaric 28 mg/10 mg daily. Denies any side effects. She is off of Sertraline. Her neuropathic pain in her feet continues to be better. She is on Gabapentin 300 mg TID. Continues to use as needed Lidocaine 5% ointment. She also takes  Baclofen 20 mg 4x/day to help ease her leg stiffness. Speech is better. Patient mentions seeing speech therapy and passed evaluation. No issues with swallowing or choking. 2. Parkinsonism/Tremors. Since the last visit on 1/09/2020, patient reports improvement of her issues with balance and walking with increase of Sinemet. She is now taking 25/100 mg TID. Denies any side effects. She also notes less postural and intentional RUE tremors. No falls. She is undergoing PT and OT 3 times a week with benefit. She continues to take Propranolol 10 mg BID for the tremors as well. Denies any side effects.  Family and patient reports baseline right arm/hand tremors that are mainly at rest. Tremors do not bother her usual activities of daily living. 3. Seizures. Records also mention of a possible complex partial seizure history. On Lamictal. Dr. Ortega  mentions a EEG done in his office that showed generalized slowing L>R with right frontal spike and wave discharges. EEG done 11/20/09 was read as normal. Last  EEG done showed generalized background slowing with bifrontal intermittent polyspike wave discharges representing seizure focus. Since the last visit on 1/9/2020, patient remains to be seizure free. She continues to take Lamotrigine 50 mg BID. Denies any side effects. Outside reports reviewed: med list from [place] [facility]:    A comprehensive review of systems was performed:   Constitutional: positive for none  Eyes: positive for none  Ears, nose, mouth, throat, and face: positive for none  Respiratory: positive for none  Cardiovascular: positive for leg swelling  Gastrointestinal: positive for none  Genitourinary: positive for none  Integument/breast: positive for none  Hematologic/lymphatic: positive for none  Musculoskeletal: positive for weakness  Neurological: positive for memory loss, tremor  Behavioral/Psych: positive for none  Endocrine: positive for none  Allergic/Immunologic: positive for none      Objective:     Visit Vitals  Resp 20   Ht 5' 4\" (1.626 m)   BMI 32.82 kg/m²     PHYSICAL EXAM:    NEUROLOGICAL EXAM:    Constitutional: Weight: obese. Ambulation: ambulates w/ walker. Head: Size/Trauma: normocephalic and atraumatic. Mental Status: Oriented but poor recent and remote memory. Fluent, no dysarthria or aphasia. Cranial Nerves: CN II - XII were intact except for decrease hearing and saccadic eye movements  Motor Exam: 5/5 in all extremity: less oncrease tone all 4 extremities with no rigidity UE. Reflexes: Reflexes Right: biceps 2/4, triceps 2/4, brachial radialis 2/4, patellar 2/4, and achilles 4/4. Reflexes Left: biceps 2/4, triceps 2/4, brachial radialis 2/4, patellar 2/4, and achilles 4/4. Plantar Reflex Right: response downgoing. Plantar Reflex Left: response downgoing. Coordination: intact FTN/ERIN; right arm/hand resting tremors and no postural/intentional tremors. Sensation: Sensation vibration decrease: on the distal extremities (glove and stocking). Gait: Better strides when walking. Not as stiff. Less bradykinesia. No turn en bloc. Better posture. Truncal instability    Assessment:   Vascular parkinsonism  Dysarthria  Multiple sclerosis  Dementia  Essential tremors  Epilepsy    Plan:   Exam reveals improvement with less increase tone all 4 extremity, less bradykinesia, better posture and better strides. Vascular parkinsonism instead of idiopathic PD. Responding to increase dose of Sinemet. Continue Sinemet 25/100 mg TID. Significant fall risk. Continue use of walker and manual wheelchair. Continue PT and OT 3 times a week for 6 more weeks. Improved. No Intermittent dysarthria today. Given response to PD medication adjustment, it was likely due to medical condition (MS and parkinsonism). Monitor for now. Neurological examination reveals changes that can be seen in Multiple Sclerosis (saccadic eye movements, increase tone in the extremities, hyperreflexia and position sense deficits). Previous Brain MRI with and without contrast did not show any new demyelinating lesions. No indication to start interferon or Copaxone therapy or oral therapy. Continue Baclofen 20 mg TID for spasticity. Advised again to use of her equipment to prevent falls and injury. Continue close supervision. Continue 5% Lidocaine ointment for localized relief of her feet pain twice a day scheduled and Gabapentin 300 mg TID. Previous neuropsychological testing confirmed underlying dementia from her medical condition and brain lesions.  Baseline dementia that causes significant limitation in learning new things and having the initiative to do things which apparently was better at the [facility] and she seems to have been thriving. Patient will still need supervision with her medications and assure compliance. Continue Namzaric daily. Suspect elements of essential tremors involving the right arm/hand. Continue Propranolol 10 mg BID    Previous EEG confirms risk for seizures due to underlying epilepsy. Continue Lamictal 50 mg BID for seizure prophylaxis. All questions and concerns were answered.

## 2023-05-30 ENCOUNTER — OFFICE VISIT (OUTPATIENT)
Age: 78
End: 2023-05-30
Payer: MEDICAID

## 2023-05-30 VITALS
HEART RATE: 64 BPM | RESPIRATION RATE: 18 BRPM | BODY MASS INDEX: 28.51 KG/M2 | TEMPERATURE: 96.9 F | HEIGHT: 64 IN | OXYGEN SATURATION: 97 % | DIASTOLIC BLOOD PRESSURE: 70 MMHG | WEIGHT: 167 LBS | SYSTOLIC BLOOD PRESSURE: 130 MMHG

## 2023-05-30 DIAGNOSIS — G47.33 OBSTRUCTIVE SLEEP APNEA (ADULT) (PEDIATRIC): ICD-10-CM

## 2023-05-30 DIAGNOSIS — G21.4 VASCULAR PARKINSONISM (HCC): Primary | ICD-10-CM

## 2023-05-30 DIAGNOSIS — M79.671 RIGHT FOOT PAIN: ICD-10-CM

## 2023-05-30 DIAGNOSIS — G25.0 ESSENTIAL TREMOR: ICD-10-CM

## 2023-05-30 DIAGNOSIS — G40.909 NONINTRACTABLE EPILEPSY WITHOUT STATUS EPILEPTICUS, UNSPECIFIED EPILEPSY TYPE (HCC): ICD-10-CM

## 2023-05-30 DIAGNOSIS — G35 MULTIPLE SCLEROSIS (HCC): ICD-10-CM

## 2023-05-30 DIAGNOSIS — F02.80 DEMENTIA IN OTHER DISEASES CLASSIFIED ELSEWHERE, UNSPECIFIED SEVERITY, WITHOUT BEHAVIORAL DISTURBANCE, PSYCHOTIC DISTURBANCE, MOOD DISTURBANCE, AND ANXIETY (HCC): ICD-10-CM

## 2023-05-30 PROCEDURE — G8427 DOCREV CUR MEDS BY ELIG CLIN: HCPCS | Performed by: PSYCHIATRY & NEUROLOGY

## 2023-05-30 PROCEDURE — 1036F TOBACCO NON-USER: CPT | Performed by: PSYCHIATRY & NEUROLOGY

## 2023-05-30 PROCEDURE — 99215 OFFICE O/P EST HI 40 MIN: CPT | Performed by: PSYCHIATRY & NEUROLOGY

## 2023-05-30 PROCEDURE — 1090F PRES/ABSN URINE INCON ASSESS: CPT | Performed by: PSYCHIATRY & NEUROLOGY

## 2023-05-30 PROCEDURE — G8419 CALC BMI OUT NRM PARAM NOF/U: HCPCS | Performed by: PSYCHIATRY & NEUROLOGY

## 2023-05-30 PROCEDURE — 1123F ACP DISCUSS/DSCN MKR DOCD: CPT | Performed by: PSYCHIATRY & NEUROLOGY

## 2023-05-30 PROCEDURE — G8399 PT W/DXA RESULTS DOCUMENT: HCPCS | Performed by: PSYCHIATRY & NEUROLOGY

## 2023-05-30 RX ORDER — ALENDRONATE SODIUM 70 MG/1
TABLET ORAL
COMMUNITY

## 2023-05-30 ASSESSMENT — PATIENT HEALTH QUESTIONNAIRE - PHQ9
SUM OF ALL RESPONSES TO PHQ QUESTIONS 1-9: 0
SUM OF ALL RESPONSES TO PHQ QUESTIONS 1-9: 0
2. FEELING DOWN, DEPRESSED OR HOPELESS: 0
SUM OF ALL RESPONSES TO PHQ QUESTIONS 1-9: 0
SUM OF ALL RESPONSES TO PHQ QUESTIONS 1-9: 0
SUM OF ALL RESPONSES TO PHQ9 QUESTIONS 1 & 2: 0
1. LITTLE INTEREST OR PLEASURE IN DOING THINGS: 0

## 2023-05-30 NOTE — PROGRESS NOTES
evaluation by podiatry for her right toe pain, treatment options, medication    This note was created using voice recognition software. Despite editing, there may be syntax errors.

## 2023-05-31 ENCOUNTER — TELEPHONE (OUTPATIENT)
Age: 78
End: 2023-05-31

## 2023-05-31 DIAGNOSIS — G21.4 VASCULAR PARKINSONISM (HCC): Primary | ICD-10-CM

## 2023-05-31 DIAGNOSIS — G35 MULTIPLE SCLEROSIS (HCC): ICD-10-CM

## 2023-05-31 NOTE — TELEPHONE ENCOUNTER
Called patient's , HIPAA verified, LM, please return call regarding home health as to which company used in the past and will send referral.

## 2023-05-31 NOTE — TELEPHONE ENCOUNTER
Patients  would like a call regarding his wife's Physical therapy. He would like to know if it's going to be in house or in [place].     Please contact

## 2023-06-01 NOTE — TELEPHONE ENCOUNTER
Spoke with patient's  and informed referral to [facility] has been faxed.  stated he has received a call already and schedule an appt.

## 2023-07-14 ENCOUNTER — TELEPHONE (OUTPATIENT)
Age: 78
End: 2023-07-14

## 2023-07-17 ENCOUNTER — TELEPHONE (OUTPATIENT)
Age: 78
End: 2023-07-17

## 2023-07-17 NOTE — TELEPHONE ENCOUNTER
Spoke with patient's , HIPAA verified, and informed had spoken with someone at [facility] and was told need to call [employer] company, [facility], and called but had to LM to return call and Dr. Ramesh Lauren unable to do online as requested due to not having an account and would a letter suffice. Patient's  will contact the representative he has been speaking with to find out and call back.

## 2023-07-17 NOTE — TELEPHONE ENCOUNTER
[facility] is calling to find out the status of the lift chair for the patient. The patients  is available for the call as well [phone].     Please contact

## 2023-07-17 NOTE — TELEPHONE ENCOUNTER
Called [facility] and was speaking with Merline Marion and got disconnected. Will try to call back.

## 2023-07-24 ENCOUNTER — TELEPHONE (OUTPATIENT)
Age: 78
End: 2023-07-24

## 2023-07-24 NOTE — TELEPHONE ENCOUNTER
Charlie Morocho from [facility] called in to inform that she spoke to Lisseth from [facility] through [address] [place] [address]. She was informed that Dr. Wally Thacker would have to download \"[employer]\" and put in the request for the Recliner Chair through them. Stated could try faxing it but not sure if it would work. Charlie Morocho stated she would like doctor/nurse to contact patient to advise them. Fax: [phone]    Please contact.

## 2023-07-28 NOTE — TELEPHONE ENCOUNTER
Spoke with patient's daughter, Chary Key verified and informed have faxed letter, to [employer], the reason why patient needs a lift chair. If any other questions or do not hear anything please call back. Jorge Ledezma verbalized understanding.

## 2023-08-02 ENCOUNTER — TELEPHONE (OUTPATIENT)
Age: 78
End: 2023-08-02

## 2023-08-02 NOTE — TELEPHONE ENCOUNTER
She's calling for an order for an electric lift chair be sent through the [employer] portal.    They don't accept regular fax anymore. Please advise.

## 2023-08-02 NOTE — TELEPHONE ENCOUNTER
Spoke with patient's daughter, Janeth Boyle verified, per Dr. Connie Garza, informed  to see if she can obtain the lift chair from her PCP because Dr. Connie Garza has never done portal log in and filling out patient request outside of [facility] because the DME company [facility]). Letter was faxed to DME as to why patient needs a lift chair but they do  not accept faxes anymore only through the portal. Rosalee Weaver verbalized understanding. Asked daughter about setting up mychart for patient. Rosalee Weaver asking how to get the code to set up mychart. Informed will need to check on that and call back. Rosalee Weaver verbalized understanding.

## 2023-08-02 NOTE — TELEPHONE ENCOUNTER
Called patient's daughter, Nikki Pedro, on the after visit summary from last visit in 5/2023 the [employer] access code should be on it and expires 9/16/2023. The code is [id number]. If you would like letter faxed to you please call back with fax number.

## 2023-08-03 RX ORDER — PROPRANOLOL HYDROCHLORIDE 10 MG/1
TABLET ORAL
Qty: 180 TABLET | Refills: 3 | Status: SHIPPED | OUTPATIENT
Start: 2023-08-03

## 2023-08-03 RX ORDER — MEMANTINE HYDROCHLORIDE AND DONEPEZIL HYDROCHLORIDE 28; 10 MG/1; MG/1
1 CAPSULE ORAL DAILY
Qty: 90 CAPSULE | Refills: 3 | Status: SHIPPED | OUTPATIENT
Start: 2023-08-03

## 2023-08-03 RX ORDER — LAMOTRIGINE 100 MG/1
TABLET ORAL
Qty: 45 TABLET | Refills: 3 | Status: SHIPPED | OUTPATIENT
Start: 2023-08-03

## 2023-08-14 ENCOUNTER — TELEPHONE (OUTPATIENT)
Age: 78
End: 2023-08-14

## 2023-08-14 NOTE — TELEPHONE ENCOUNTER
Rebecca Meng is calling back regarding her mothers request for a chair so she will be able to get in and out of with no problem. Daughter could no remember the nurses name she spoke with.  Please try the cell number first.    Please contact

## 2023-08-15 NOTE — TELEPHONE ENCOUNTER
Spoke with a patient's daughter, [place], patient'sDOB verified, and states DME company informed her that Dr. Ingrid Chow or someone in the office needs to go to the DME website- [employer] and creat an ID and password and upload RX with diagnosis for lift chair, office note, and reason patient needs a lift chair. Informed [place], will have to check, and will let her know. Shima verbalized understanding.

## 2023-10-23 RX ORDER — BACLOFEN 10 MG/1
10 TABLET ORAL 3 TIMES DAILY
Qty: 270 TABLET | Refills: 3 | Status: SHIPPED | OUTPATIENT
Start: 2023-10-23

## 2023-11-28 ENCOUNTER — OFFICE VISIT (OUTPATIENT)
Age: 78
End: 2023-11-28
Payer: MEDICAID

## 2023-11-28 VITALS
HEIGHT: 64 IN | TEMPERATURE: 96.8 F | HEART RATE: 64 BPM | BODY MASS INDEX: 29.19 KG/M2 | RESPIRATION RATE: 18 BRPM | WEIGHT: 171 LBS | DIASTOLIC BLOOD PRESSURE: 74 MMHG | OXYGEN SATURATION: 97 % | SYSTOLIC BLOOD PRESSURE: 126 MMHG

## 2023-11-28 DIAGNOSIS — G40.909 SEIZURE DISORDER (HCC): ICD-10-CM

## 2023-11-28 DIAGNOSIS — G25.0 ESSENTIAL TREMOR: ICD-10-CM

## 2023-11-28 DIAGNOSIS — F02.80 DEMENTIA IN OTHER DISEASES CLASSIFIED ELSEWHERE, UNSPECIFIED SEVERITY, WITHOUT BEHAVIORAL DISTURBANCE, PSYCHOTIC DISTURBANCE, MOOD DISTURBANCE, AND ANXIETY (HCC): ICD-10-CM

## 2023-11-28 DIAGNOSIS — G47.33 OBSTRUCTIVE SLEEP APNEA (ADULT) (PEDIATRIC): ICD-10-CM

## 2023-11-28 DIAGNOSIS — G35 MULTIPLE SCLEROSIS (HCC): ICD-10-CM

## 2023-11-28 DIAGNOSIS — G21.4 VASCULAR PARKINSONISM (HCC): Primary | ICD-10-CM

## 2023-11-28 PROCEDURE — G8419 CALC BMI OUT NRM PARAM NOF/U: HCPCS | Performed by: PSYCHIATRY & NEUROLOGY

## 2023-11-28 PROCEDURE — 1036F TOBACCO NON-USER: CPT | Performed by: PSYCHIATRY & NEUROLOGY

## 2023-11-28 PROCEDURE — 99214 OFFICE O/P EST MOD 30 MIN: CPT | Performed by: PSYCHIATRY & NEUROLOGY

## 2023-11-28 PROCEDURE — 1090F PRES/ABSN URINE INCON ASSESS: CPT | Performed by: PSYCHIATRY & NEUROLOGY

## 2023-11-28 PROCEDURE — G8427 DOCREV CUR MEDS BY ELIG CLIN: HCPCS | Performed by: PSYCHIATRY & NEUROLOGY

## 2023-11-28 PROCEDURE — 1123F ACP DISCUSS/DSCN MKR DOCD: CPT | Performed by: PSYCHIATRY & NEUROLOGY

## 2023-11-28 PROCEDURE — G8484 FLU IMMUNIZE NO ADMIN: HCPCS | Performed by: PSYCHIATRY & NEUROLOGY

## 2023-11-28 PROCEDURE — G8399 PT W/DXA RESULTS DOCUMENT: HCPCS | Performed by: PSYCHIATRY & NEUROLOGY

## 2023-11-28 NOTE — PROGRESS NOTES
chair.    Neurological examination reveals changes that can be seen in Multiple Sclerosis (saccadic eye movements, increase tone in the extremities, hyperreflexia and position sense deficits). Previous Brain MRI with and without contrast did not show any new demyelinating lesions. No indication to start interferon or Copaxone therapy or oral therapy. Continue baclofen 10 mg 3 times daily. Advised again to use of her equipment to prevent falls and injury. Continue close supervision. Previous neuropsychological testing confirmed underlying dementia from her medical condition and brain lesions. Baseline dementia that causes significant limitation in learning new things and having the initiative to do things. Patient will still need supervision with her medications and assure compliance. Continue Namzaric daily. Advised to obtain cameras at home for safety when  is not home. Suspect elements of essential tremors involving the right arm/hand. Continue Propranolol 10 mg BID. Previous EEG confirms risk for seizures due to underlying epilepsy. Continue Lamictal 50 mg daily for seizure prophylaxis. Diagnosed with obstructive sleep apnea. Continue CPAP supplement. All questions and concerns were answered. This note was created using voice recognition software. Despite editing, there may be syntax errors.

## 2023-11-29 ENCOUNTER — TELEPHONE (OUTPATIENT)
Age: 78
End: 2023-11-29

## 2023-12-01 ENCOUNTER — TELEPHONE (OUTPATIENT)
Age: 78
End: 2023-12-01

## 2023-12-01 NOTE — TELEPHONE ENCOUNTER
[facility] called regarding patient needing a script for a power lift chair sent through [employer] provider portal so it can be approved.       [facility] number - [phone]

## 2023-12-04 ENCOUNTER — TELEPHONE (OUTPATIENT)
Age: 78
End: 2023-12-04

## 2023-12-04 NOTE — TELEPHONE ENCOUNTER
Patient called to return nurses' call about provider portal. They said it's through provider connections and they can verify member eligibility there, among other things.    They would like Kareem's nurse to give them a call back at: [phone]    Thank you!

## 2023-12-04 NOTE — TELEPHONE ENCOUNTER
Spoke with patient's  and care-giver,  verified, and need the prescription for a lift chair uploaded to the [employer] portal. Asked what was the portal site but just said it was the [employer]. Informed  and caregiver will try to call [employer].

## 2023-12-11 NOTE — TELEPHONE ENCOUNTER
Called patient's , BESS, have spoken to [employer] representative to inquire about the prescription for the lift chair and was told it has to go through her primary insurance which is [employer] and not through [employer]. Please call [employer] [facility] and see what  DME company you can use to get the liftchair and please call back with DME company name so when can send the prescription to the DME company that is in network with insurance.

## 2024-05-02 RX ORDER — BACLOFEN 10 MG/1
10 TABLET ORAL 3 TIMES DAILY
Qty: 270 TABLET | Refills: 3 | Status: SHIPPED | OUTPATIENT
Start: 2024-07-01 | End: 2025-06-30

## 2024-05-12 DIAGNOSIS — F02.80 DEMENTIA IN OTHER DISEASES CLASSIFIED ELSEWHERE, UNSPECIFIED SEVERITY, WITHOUT BEHAVIORAL DISTURBANCE, PSYCHOTIC DISTURBANCE, MOOD DISTURBANCE, AND ANXIETY (HCC): ICD-10-CM

## 2024-05-12 DIAGNOSIS — G25.0 ESSENTIAL TREMOR: ICD-10-CM

## 2024-05-12 DIAGNOSIS — G21.4 VASCULAR PARKINSONISM (HCC): Primary | ICD-10-CM

## 2024-05-12 DIAGNOSIS — G40.909 SEIZURE DISORDER (HCC): ICD-10-CM

## 2024-05-14 RX ORDER — MEMANTINE HYDROCHLORIDE AND DONEPEZIL HYDROCHLORIDE 28; 10 MG/1; MG/1
1 CAPSULE ORAL DAILY
Qty: 90 CAPSULE | Refills: 3 | Status: SHIPPED | OUTPATIENT
Start: 2024-05-14

## 2024-05-14 RX ORDER — PROPRANOLOL HYDROCHLORIDE 10 MG/1
TABLET ORAL
Qty: 180 TABLET | Refills: 3 | Status: SHIPPED | OUTPATIENT
Start: 2024-05-14

## 2024-05-14 RX ORDER — LAMOTRIGINE 100 MG/1
TABLET ORAL
Qty: 45 TABLET | Refills: 3 | Status: SHIPPED | OUTPATIENT
Start: 2024-05-14